# Patient Record
Sex: FEMALE | Race: WHITE
[De-identification: names, ages, dates, MRNs, and addresses within clinical notes are randomized per-mention and may not be internally consistent; named-entity substitution may affect disease eponyms.]

---

## 2019-02-23 ENCOUNTER — HOSPITAL ENCOUNTER (EMERGENCY)
Dept: HOSPITAL 97 - ER | Age: 59
Discharge: HOME | End: 2019-02-23
Payer: SELF-PAY

## 2019-02-23 VITALS — SYSTOLIC BLOOD PRESSURE: 138 MMHG | TEMPERATURE: 98.8 F | OXYGEN SATURATION: 99 % | DIASTOLIC BLOOD PRESSURE: 94 MMHG

## 2019-02-23 DIAGNOSIS — I89.1: ICD-10-CM

## 2019-02-23 DIAGNOSIS — L03.818: ICD-10-CM

## 2019-02-23 DIAGNOSIS — F20.9: ICD-10-CM

## 2019-02-23 DIAGNOSIS — Z88.5: ICD-10-CM

## 2019-02-23 DIAGNOSIS — Z88.1: ICD-10-CM

## 2019-02-23 DIAGNOSIS — H53.9: ICD-10-CM

## 2019-02-23 DIAGNOSIS — H57.11: Primary | ICD-10-CM

## 2019-02-23 LAB
ALBUMIN SERPL BCP-MCNC: 3.5 G/DL (ref 3.4–5)
ALP SERPL-CCNC: 220 U/L (ref 45–117)
ALT SERPL W P-5'-P-CCNC: 32 U/L (ref 12–78)
AST SERPL W P-5'-P-CCNC: 16 U/L (ref 15–37)
BUN BLD-MCNC: 4 MG/DL (ref 7–18)
GLUCOSE SERPLBLD-MCNC: 107 MG/DL (ref 74–106)
HCT VFR BLD CALC: 38.9 % (ref 36–45)
LYMPHOCYTES # SPEC AUTO: 1.3 K/UL (ref 0.7–4.9)
PMV BLD: 8.9 FL (ref 7.6–11.3)
POTASSIUM SERPL-SCNC: 3.5 MMOL/L (ref 3.5–5.1)
RBC # BLD: 4.36 M/UL (ref 3.86–4.86)
VALPROATE SERPL-MCNC: 44 UG/ML (ref 50–100)

## 2019-02-23 PROCEDURE — 80053 COMPREHEN METABOLIC PANEL: CPT

## 2019-02-23 PROCEDURE — 36415 COLL VENOUS BLD VENIPUNCTURE: CPT

## 2019-02-23 PROCEDURE — 99284 EMERGENCY DEPT VISIT MOD MDM: CPT

## 2019-02-23 PROCEDURE — 96360 HYDRATION IV INFUSION INIT: CPT

## 2019-02-23 PROCEDURE — 80164 ASSAY DIPROPYLACETIC ACD TOT: CPT

## 2019-02-23 PROCEDURE — 85025 COMPLETE CBC W/AUTO DIFF WBC: CPT

## 2019-02-23 NOTE — ER
Nurse's Notes                                                                                     

 [facility]                                                                

Name: Marcia Peña                                                                              

Age: 58 yrs                                                                                       

Sex: Female                                                                                       

: 1960                                                                                   

MRN: [id number]                                                                                   

Arrival Date: 2019                                                                          

Time: 08:03                                                                                       

Account#: [id number]                                                                            

Bed 5                                                                                             

Private MD:                                                                                       

Diagnosis: Visual disturbances;Visual discomfort, right eye;Schizophrenia;Cellulitis and acute    

  lymphangitis of other parts of limb                                                             

                                                                                                  

Presentation:                                                                                     

                                                                                             

08:12 Presenting complaint: Patient states: I feel like something is in my R eye, for the     ch  

      past 8 weeks. It feels like something is in there, day and night. AS well, for the past     

      many years, actually all my life, I have had foot problems. At least 50 years. they         

      itch and burn all the time. Transition of care: patient was not received from another       

      setting of care. Onset of symptoms was 2018. Risk Assessment: Do you want to       

      hurt yourself or someone else? Patient reports no desire to harm self or others.            

      Initial Sepsis Screen: Does the patient meet any 2 criteria? No. Patient's initial          

      sepsis screen is negative. Does the patient have a suspected source of infection? No.       

      Patient's initial sepsis screen is negative. Care prior to arrival: None.                   

08:12 Method Of Arrival: Ambulatory                                                             

08:12 Acuity: JIMMY 4                                                                           ch  

                                                                                                  

Triage Assessment:                                                                                

08:15 General: Appears in no apparent distress. comfortable, Behavior is cooperative,         ch  

      anxious, restless. Pain: Complains of pain in right eye, right foot and left foot Pain      

      currently is 5 out of 10 on a pain scale. Pain began years ago. EENT: Eyes pt eyes both     

      appear WDP. Neuro: No deficits noted. Level of Consciousness is awake, alert, obeys         

      commands, Oriented to person, place, time, situation. Cardiovascular: Denies chest          

      pain. Respiratory: Airway is patent Respiratory effort is even, unlabored, Breath           

      sounds are clear bilaterally. GI: No signs and/or symptoms were reported involving the      

      gastrointestinal system. Derm: Skin is pink, warm \T\ dry.                                  

                                                                                                  

Historical:                                                                                       

- Allergies:                                                                                      

08:15 Codeine;                                                                                ch  

08:15 Erythromycin;                                                                           ch  

- Home Meds:                                                                                      

08:15 pt states she takes medictions but does not know what they are [Active];                ch  

- PMHx:                                                                                           

08:15 Schizophrenia; pt denies any medical hx;                                                ch  

- PSHx:                                                                                           

08:15 colon surgery; Cholecystectomy; left foot surgery; bladder;                             ch  

                                                                                                  

- Immunization history:: Adult Immunizations not up to date, Last tetanus immunization:           

  not indicated for visit today. Flu vaccine is not up to date.                                   

- Social history:: Smoking status: Patient uses tobacco products, smokes one-half pack            

  cigarettes per day, Patient/guardian denies using alcohol, street drugs.                        

- Ebola Screening: : Patient negative for fever greater than or equal to 101.5 degrees            

  Fahrenheit, and additional compatible Ebola Virus Disease symptoms Patient denies               

  exposure to infectious person Patient denies travel to an Ebola-affected area in the            

  21 days before illness onset No symptoms or risks identified at this time.                      

                                                                                                  

                                                                                                  

Screenin:18 Abuse screen: Denies threats or abuse. Denies injuries from another. Nutritional        ch  

      screening: No deficits noted. Tuberculosis screening: No symptoms or risk factors           

      identified. Fall Risk None identified.                                                      

                                                                                                  

Assessment:                                                                                       

08:18 Reassessment: Patient appears in no apparent distress at this time. No changes from       

      previously documented assessment. Patient and/or family updated on plan of care and         

      expected duration. Pain level reassessed.                                                   

08:57 Reassessment: AWAITING ERP TO ASSESS AND TREAT PT. General: Appears in no apparent        

      distress. comfortable, Behavior is appropriate for age, restless.                           

09:11 Reassessment: Patient appears in no apparent distress at this time. Patient and/or        

      family updated on plan of care and expected duration. Pain level reassessed. PHYSICIAN      

      HAS SEEN PT, IV WILL BE INITIATED.                                                          

10:12 Reassessment: Patient appears in no apparent distress at this time. No changes from       

      previously documented assessment. Patient and/or family updated on plan of care and         

      expected duration. Pain level reassessed. Patient is alert, oriented x 3, equal             

      unlabored respirations, skin warm/dry/pink. PT ATTEMPTS TO URINATE, UNSUCCESSFULLY. ERP     

      STATES PT CAN BE DISCHARGED AS SHE HAS NO URINARY SYMPTOMS. Patient denies pain at this     

      time. Patient states feeling better. Patient states symptoms have improved.                 

                                                                                                  

Vital Signs:                                                                                      

08:15  / 100; Pulse 96; Resp 20; Temp 97.5; Pulse Ox 98% on R/A; Weight 90.72 kg;         

      Height 5 ft. 4 in. (162.56 cm); Pain 5/10;                                                  

08:57  / 94; Pulse 96; Resp 14; Temp 98.8; Pulse Ox 99% on R/A; Pain 5/10;              ch  

10:32  / 97; Pulse 98; Resp 16; Temp 98.2; Pulse Ox 99% on R/A; Pain 5/10;              ch  

08:15 Body Mass Index 34.33 (90.72 kg, 162.56 cm)                                             ch  

                                                                                                  

Visual Acuity:                                                                                    

08:58 Left Eye Visual acuity 20/30, Pupil size 4 mm, Normal, React To Light, Reactive To      ch  

      Accomodation; Right Eye Visual acuity 20/40, Pupil size 4 mm, Normal, React To Light,       

      Reactive To Accomodation; Both Eyes Visual acuity 20/30; With Lenses;                       

                                                                                                  

ED Course:                                                                                        

08:03 Patient arrived in ED.                                                                  as  

08:08 Syed Saravia MD is Attending Physician.                                             luh 

08:12 Veronica Mustafa, RN is Primary Nurse.                                                ch  

08:14 Triage completed.                                                                       ch  

08:15 Arm band placed on left wrist. Patient placed in an exam room, on a stretcher, on pulse   

      oximetry.                                                                                   

08:18 No apparent distress. Resting quietly.                                                  ch  

08:18 Patient has correct armband on for positive identification. Bed in low position. Call     

      light in reach. Side rails up X 1. Pulse ox on. NIBP on. PO fluids given. Verbal            

      reassurance given.                                                                          

09:24 Initial lab(s) drawn, by me, sent to lab. Inserted saline lock: 20 gauge in left        em1 

      antecubital area, using aseptic technique. Blood collected. Missed attempt(s): 22 gauge     

      in left forearm. Bleeding controlled, band aid applied, catheter tip intact.                

10:09 Fabio Vásquez MD is Referral Physician.                                              luh 

10:12 No provider procedures requiring assistance completed. IV discontinued, intact,         ch  

      bleeding controlled, No redness/swelling at site. Pressure dressing applied.                

                                                                                                  

Administered Medications:                                                                         

09:00 Drug: Tetracaine Drops 0.5 % 1 drops Route: Ophthalmic; Site: both eyes;                  

09:11 Follow up: Response: No adverse reaction                                                  

09:25 Drug: NS 0.9% 500 ml Route: IV; Rate: bolus; Site: left antecubital;                    ch  

10:00 Follow up: IV Status: Completed infusion; IV Intake: 500ml                              ch  

10:18 Drug: Depakene 500 mg Route: PO;                                                          

10:22 Follow up: Response: No adverse reaction                                                ch  

                                                                                                  

                                                                                                  

Intake:                                                                                           

10:00 IV: 500ml; Total: 500ml.                                                                  

                                                                                                  

Outcome:                                                                                          

10:09 Discharge ordered by MD.                                                                luh 

10:12 Discharged to home ambulatory.                                                            

10:12 Condition: stable                                                                           

10:12 Discharge instructions given to patient, Instructed on discharge instructions, follow       

      up and referral plans. medication usage, Demonstrated understanding of instructions,        

      follow-up care, medications, Prescriptions given X 2.                                       

10:24 Patient left the ED.                                                                      

                                                                                                  

Signatures:                                                                                       

Veronica Mustafa RN                  RN   Syed Puente MD MD cha Martinez, Amelia as Martinez, Eric                               1                                                  

                                                                                                  

Corrections: (The following items were deleted from the chart)                                    

10:13 08:57 Patient did not have IV access during this emergency room visit. [facility]  

                                                                                                  

**************************************************************************************************

## 2019-02-23 NOTE — EDPHYS
Physician Documentation                                                                           

 [facility]                                                                

Name: Marcia Peña                                                                              

Age: 58 yrs                                                                                       

Sex: Female                                                                                       

: 1960                                                                                   

MRN: [id number]                                                                                   

Arrival Date: 2019                                                                          

Time: 08:03                                                                                       

Account#: [id number]                                                                            

Bed 5                                                                                             

Private MD:                                                                                       

Syed Forte                                                                      

HPI:                                                                                              

                                                                                             

09:08 This 58 yrs old  Female presents to ER via Ambulatory with complaints of Eye   luh 

      Problem.                                                                                    

09:08 The patient is experiencing pain. Onset: The symptoms/episode began/occurred 1 month(s) luh 

      ago. Aggravated by nothing. Alleviated by nothing. Associated signs and symptoms:           

      Pertinent positives: None.                                                                  

                                                                                                  

Historical:                                                                                       

- Allergies:                                                                                      

08:15 Codeine;                                                                                ch  

08:15 Erythromycin;                                                                           ch  

- Home Meds:                                                                                      

08:15 pt states she takes medictions but does not know what they are [Active];                ch  

- PMHx:                                                                                           

08:15 Schizophrenia; pt denies any medical hx;                                                ch  

- PSHx:                                                                                           

08:15 colon surgery; Cholecystectomy; left foot surgery; bladder;                             ch  

                                                                                                  

- Immunization history:: Adult Immunizations not up to date, Last tetanus immunization:           

  not indicated for visit today. Flu vaccine is not up to date.                                   

- Social history:: Smoking status: Patient uses tobacco products, smokes one-half pack            

  cigarettes per day, Patient/guardian denies using alcohol, street drugs.                        

- Ebola Screening: : Patient negative for fever greater than or equal to 101.5 degrees            

  Fahrenheit, and additional compatible Ebola Virus Disease symptoms Patient denies               

  exposure to infectious person Patient denies travel to an Ebola-affected area in the            

  21 days before illness onset No symptoms or risks identified at this time.                      

                                                                                                  

                                                                                                  

ROS:                                                                                              

09:08 Constitutional: Negative for fever, chills, and weight loss, ENT: Negative for injury,  luh 

      pain, and discharge, Neck: Negative for injury, pain, and swelling, Cardiovascular:         

      Negative for chest pain, palpitations, and edema, Respiratory: Negative for shortness       

      of breath, cough, wheezing, and pleuritic chest pain, Abdomen/GI: Negative for              

      abdominal pain, nausea, vomiting, diarrhea, and constipation, Back: Negative for injury     

      and pain, : Negative for injury, bleeding, discharge, and swelling, Skin: Negative        

      for injury, rash, and discoloration, Neuro: Negative for headache, weakness, numbness,      

      tingling, and seizure, Psych: Negative for depression, anxiety, suicide ideation,           

      homicidal ideation, and hallucinations, Allergy/Immunology: Negative for hives, rash,       

      and allergies, Endocrine: Negative for neck swelling, polydipsia, polyuria, polyphagia,     

      and marked weight changes, Hematologic/Lymphatic: Negative for swollen nodes, abnormal      

      bleeding, and unusual bruising.                                                             

09:08 Eyes: Positive for blurry vision, pain, of the iris of right eye.                           

09:08 MS/extremity: Positive for erythema, pain, swelling, tenderness.                            

                                                                                                  

Exam:                                                                                             

09:08 Constitutional:  This is a well developed, well nourished patient who is awake, alert,  luh 

      and in no acute distress. Head/Face:  Normocephalic, atraumatic. ENT:  Nares patent. No     

      nasal discharge, no septal abnormalities noted.  Tympanic membranes are normal and          

      external auditory canals are clear.  Oropharynx with no redness, swelling, or masses,       

      exudates, or evidence of obstruction, uvula midline.  Mucous membranes moist. Neck:         

      Trachea midline, no thyromegaly or masses palpated, and no cervical lymphadenopathy.        

      Supple, full range of motion without nuchal rigidity, or vertebral point tenderness.        

      No Meningismus. Chest/axilla:  Normal chest wall appearance and motion.  Nontender with     

      no deformity.  No lesions are appreciated. Cardiovascular:  Regular rate and rhythm         

      with a normal S1 and S2.  No gallops, murmurs, or rubs.  Normal PMI, no JVD.  No pulse      

      deficits. Respiratory:  Lungs have equal breath sounds bilaterally, clear to                

      auscultation and percussion.  No rales, rhonchi or wheezes noted.  No increased work of     

      breathing, no retractions or nasal flaring. Abdomen/GI:  Soft, non-tender, with normal      

      bowel sounds.  No distension or tympany.  No guarding or rebound.  No evidence of           

      tenderness throughout. Back:  No spinal tenderness.  No costovertebral tenderness.          

      Full range of motion. Skin:  Warm, dry with normal turgor.  Normal color with no            

      rashes, no lesions, and no evidence of cellulitis. Neuro:  Awake and alert, GCS 15,         

      oriented to person, place, time, and situation.  Cranial nerves II-XII grossly intact.      

      Motor strength 5/5 in all extremities.  Sensory grossly intact.  Cerebellar exam            

      normal.  Normal gait. Psych:  Awake, alert, with orientation to person, place and time.     

       Behavior, mood, and affect are within normal limits.                                       

09:08 Eyes: Pupils: no acute changes, equal, round, and reactive to light and accomodation,       

      Extraocular movements: no acute changes, Conjunctiva: normal, no acute changes,             

      Corneas: are normal, no acute changes, Sclera: no appreciated abnormality, Anterior         

      chamber: normal, no acute changes, Lids and lashes: appear normal, no acute changes,        

      funduscopic exam reveals no obvious abnormalities, no acute changes, discs that are         

      sharp, no appreciated papilledema, no retinal detachment, no enlargement of the optic       

      cup, no appreciated A-V knicking, no evidence of cotton wool exudatates, no flame           

      hemorrhages, no macular cherry red spot, no afferent papillary defect, Visual fields:       

      are intact, no acute changes, Nystagmus: is not appreciated.                                

09:08 Musculoskeletal/extremity: DVT Exam: No signs of deep vein thrombosis. no pain, no      luh 

      swelling, no tenderness, negative Homans' sign noted on exam, no appreciated bluish         

      discoloration, no erythema, no increased warmth.                                            

                                                                                                  

Vital Signs:                                                                                      

08:15  / 100; Pulse 96; Resp 20; Temp 97.5; Pulse Ox 98% on R/A; Weight 90.72 kg;       ch  

      Height 5 ft. 4 in. (162.56 cm); Pain 5/10;                                                  

08:57  / 94; Pulse 96; Resp 14; Temp 98.8; Pulse Ox 99% on R/A; Pain 5/10;              ch  

10:32  / 97; Pulse 98; Resp 16; Temp 98.2; Pulse Ox 99% on R/A; Pain 5/10;              ch  

08:15 Body Mass Index 34.33 (90.72 kg, 162.56 cm)                                               

                                                                                                  

Visual Acuity:                                                                                    

08:58 Left Eye Visual acuity 20/30, Pupil size 4 mm, Normal, React To Light, Reactive To      ch  

      Accomodation; Right Eye Visual acuity 20/40, Pupil size 4 mm, Normal, React To Light,       

      Reactive To Accomodation; Both Eyes Visual acuity 20/30; With Lenses;                       

                                                                                                  

MDM:                                                                                              

08:08 Patient medically screened.                                                             [facility] 

09:10 Data reviewed: vital signs, nurses notes, lab test result(s).                           [facility] 

                                                                                                  

                                                                                             

09:07 Order name: CBC with Diff; Complete Time: 09:42                                         [facility] 

                                                                                             

09:07 Order name: Comprehensive Metabolic Panel; Complete Time: 10:08                         [facility] 

                                                                                             

09:07 Order name: Depakote; Complete Time: 10:08                                              [facility] 

                                                                                             

08:42 Order name: Eye Tray; Complete Time: 08:56                                                

                                                                                             

08:42 Order name: Fluoresene Opth strip; Complete Time: 08:57                                   

                                                                                             

08:57 Order name: Visual Acuity; Complete Time: 08:57                                           

                                                                                                  

Administered Medications:                                                                         

09:00 Drug: Tetracaine Drops 0.5 % 1 drops Route: Ophthalmic; Site: both eyes;                  

09:11 Follow up: Response: No adverse reaction                                                  

09:25 Drug: NS 0.9% 500 ml Route: IV; Rate: bolus; Site: left antecubital;                      

10:00 Follow up: IV Status: Completed infusion; IV Intake: 500ml                                

10:18 Drug: Depakene 500 mg Route: PO;                                                          

10:22 Follow up: Response: No adverse reaction                                                  

                                                                                                  

                                                                                                  

Disposition:                                                                                      

19 10:09 Discharged to Home. Impression: Visual disturbances, Visual discomfort, right      

  eye, Schizophrenia, Cellulitis and acute lymphangitis of other parts of limb.                   

- Condition is Stable.                                                                            

- Discharge Instructions: Blurred Vision, Adult, Cellulitis, Adult, Easy-to-Read.                 

- Prescriptions for Amitriptyline 25 mg Oral Tablet - take 1 tablet by ORAL route At              

  bedtime As needed; 20 tablet. Bactrim - 160 mg Oral Tablet - take 1 tablet by             

  ORAL route every 12 hours for 10 days; 20 tablet.                                               

- Medication Reconciliation Form, Thank You Letter, Antibiotic Education, Prescription            

  Opioid Use form.                                                                                

- Follow up: Private Physician; When: 2 - 3 days; Reason: Recheck today's complaints,             

  Continuance of care, Re-evaluation by your physician. Follow up: Fabio Vásquez;                

  When: 2 - 3 days; Reason: Recheck today's complaints, Continuance of care,                      

  Re-evaluation by your physician.                                                                

- Problem is new.                                                                                 

- Symptoms have improved.                                                                         

                                                                                                  

                                                                                                  

                                                                                                  

Signatures:                                                                                       

Dispatcher [employer]                           Veronica Louie RN                  Syed Roblero ch, MD MD cha                                                  

                                                                                                  

Corrections: (The following items were deleted from the chart)                                    

10:24 10:09 2019 10:09 Discharged to Home. Impression: Visual disturbances; Visual      ch  

      discomfort, right eye; Schizophrenia; Cellulitis and acute lymphangitis of other parts      

      of limb. Condition is Stable. Discharge Instructions: Blurred Vision, Adult,                

      Cellulitis, Adult, Easy-to-Read. Prescriptions for Amitriptyline 25 mg Oral Tablet -        

      take 1 tablet by ORAL route At bedtime As needed; 20 tablet, Bactrim -160 mg Oral     

      Tablet - take 1 tablet by ORAL route every 12 hours for 10 days; 20 tablet. and Forms       

      are Medication Reconciliation Form, Thank You Letter, Antibiotic Education,                 

      Prescription Opioid Use. Follow up: Private Physician; When: 2 - 3 days; Reason:            

      Recheck today's complaints, Continuance of care, Re-evaluation by your physician.           

      Follow up: Fabio Vásquez; When: 2 - 3 days; Reason: Recheck today's complaints,            

      Continuance of care, Re-evaluation by your physician. Problem is new. Symptoms have         

      improved. luh                                                                               

                                                                                                  

**************************************************************************************************

## 2019-03-20 ENCOUNTER — HOSPITAL ENCOUNTER (EMERGENCY)
Dept: HOSPITAL 97 - ER | Age: 59
Discharge: PSYCHIATRIC OR SUBSTANCE USE FACILITY | End: 2019-03-20
Payer: SELF-PAY

## 2019-03-20 VITALS — DIASTOLIC BLOOD PRESSURE: 95 MMHG | OXYGEN SATURATION: 99 % | SYSTOLIC BLOOD PRESSURE: 137 MMHG

## 2019-03-20 VITALS — TEMPERATURE: 97.6 F

## 2019-03-20 DIAGNOSIS — Z88.5: ICD-10-CM

## 2019-03-20 DIAGNOSIS — Z88.3: ICD-10-CM

## 2019-03-20 DIAGNOSIS — F20.9: Primary | ICD-10-CM

## 2019-03-20 LAB
ALBUMIN SERPL BCP-MCNC: 3.6 G/DL (ref 3.4–5)
ALP SERPL-CCNC: 184 U/L (ref 45–117)
ALT SERPL W P-5'-P-CCNC: 46 U/L (ref 12–78)
AST SERPL W P-5'-P-CCNC: 27 U/L (ref 15–37)
BUN BLD-MCNC: 4 MG/DL (ref 7–18)
GLUCOSE SERPLBLD-MCNC: 100 MG/DL (ref 74–106)
HCT VFR BLD CALC: 39.5 % (ref 36–45)
INR BLD: 1.17
LYMPHOCYTES # SPEC AUTO: 1.8 K/UL (ref 0.7–4.9)
METHAMPHET UR QL SCN: NEGATIVE
PMV BLD: 10 FL (ref 7.6–11.3)
POTASSIUM SERPL-SCNC: 3.6 MMOL/L (ref 3.5–5.1)
RBC # BLD: 4.44 M/UL (ref 3.86–4.86)
THC SERPL-MCNC: NEGATIVE NG/ML

## 2019-03-20 PROCEDURE — 85730 THROMBOPLASTIN TIME PARTIAL: CPT

## 2019-03-20 PROCEDURE — 99285 EMERGENCY DEPT VISIT HI MDM: CPT

## 2019-03-20 PROCEDURE — 80307 DRUG TEST PRSMV CHEM ANLYZR: CPT

## 2019-03-20 PROCEDURE — 81003 URINALYSIS AUTO W/O SCOPE: CPT

## 2019-03-20 PROCEDURE — 93005 ELECTROCARDIOGRAM TRACING: CPT

## 2019-03-20 PROCEDURE — 80164 ASSAY DIPROPYLACETIC ACD TOT: CPT

## 2019-03-20 PROCEDURE — 80320 DRUG SCREEN QUANTALCOHOLS: CPT

## 2019-03-20 PROCEDURE — 80076 HEPATIC FUNCTION PANEL: CPT

## 2019-03-20 PROCEDURE — 85610 PROTHROMBIN TIME: CPT

## 2019-03-20 PROCEDURE — 36415 COLL VENOUS BLD VENIPUNCTURE: CPT

## 2019-03-20 PROCEDURE — 80048 BASIC METABOLIC PNL TOTAL CA: CPT

## 2019-03-20 PROCEDURE — 96372 THER/PROPH/DIAG INJ SC/IM: CPT

## 2019-03-20 PROCEDURE — 51702 INSERT TEMP BLADDER CATH: CPT

## 2019-03-20 PROCEDURE — 85025 COMPLETE CBC W/AUTO DIFF WBC: CPT

## 2019-03-20 PROCEDURE — 80329 ANALGESICS NON-OPIOID 1 OR 2: CPT

## 2019-03-20 NOTE — EDPHYS
Physician Documentation                                                                           

 [facility]                                                                

Name: Marcia Peña                                                                              

Age: 58 yrs                                                                                       

Sex: Female                                                                                       

: 1960                                                                                   

MRN: [id number]                                                                                   

Arrival Date: 2019                                                                          

Time: 15:06                                                                                       

Account#: [id number]                                                                            

Bed 16                                                                                            

Private MD:                                                                                       

ED Physician Syed Saravia                                                                      

HPI:                                                                                              

                                                                                             

16:05 This 58 yrs old  Female presents to ER via EMS with complaints of Psych        luh 

      Problem.                                                                                    

16:05 The patient presents to the emergency department with anxiety, depression, psychosis,   luh 

      has delusions. Onset: The symptoms/episode began/occurred 3 day(s) ago. Past                

      psychiatric history: Prior diagnosis: schizophrenia, Psychiatric medications include:       

      unk. Associated signs and symptoms: The patient has no apparent associated signs or         

      symptoms. Severity of symptoms: At their worst the symptoms were mild moderate in the       

      emergency department the symptoms are unchanged. The patient has not experienced            

      similar symptoms in the past.                                                               

                                                                                                  

Historical:                                                                                       

- Allergies:                                                                                      

15:09 Codeine;                                                                                bp  

15:09 Erythromycin;                                                                           bp  

- Home Meds:                                                                                      

17:29 Depakote  mg Oral Tb24 twice a day [Active]; paroxetine HCl 20 mg oral tab 1 tab  iw  

      once daily [Active]; risperidone 3 mg oral tab 1 tab 2 times per day [Active];              

      benztropine 0.5 mg Oral tab 1 tab 2 times per day [Active]; amitriptyline 100 mg Oral       

      tab 1 tab 2 times per day [Active]; loxapine succinate 25 mg Oral cap 3 times per day       

      [Active];                                                                                   

- PMHx:                                                                                           

15:09 Schizophrenia;                                                                          bp  

- PSHx:                                                                                           

15:09 Unable to obtain;                                                                       bp  

                                                                                                  

- Immunization history:: Adult Immunizations up to date.                                          

- Social history:: Smoking status: unknown.                                                       

- Ebola Screening: : Patient negative for fever greater than or equal to 101.5 degrees            

  Fahrenheit, and additional compatible Ebola Virus Disease symptoms Patient denies               

  exposure to infectious person Patient denies travel to an Ebola-affected area in the            

  21 days before illness onset No symptoms or risks identified at this time.                      

- Family history:: not pertinent.                                                                 

                                                                                                  

                                                                                                  

ROS:                                                                                              

16:05 Constitutional: Negative for fever, chills, and weight loss, Eyes: Negative for injury, luh 

      pain, redness, and discharge, ENT: Negative for injury, pain, and discharge, Neck:          

      Negative for injury, pain, and swelling, Cardiovascular: Negative for chest pain,           

      palpitations, and edema, Respiratory: Negative for shortness of breath, cough,              

      wheezing, and pleuritic chest pain, Abdomen/GI: Negative for abdominal pain, nausea,        

      vomiting, diarrhea, and constipation, Back: Negative for injury and pain, : Negative      

      for injury, bleeding, discharge, and swelling, MS/Extremity: Negative for injury and        

      deformity, Skin: Negative for injury, rash, and discoloration, Psych: Negative for          

      depression, anxiety, suicide ideation, homicidal ideation, and hallucinations,              

      Allergy/Immunology: Negative for hives, rash, and allergies, Endocrine: Negative for        

      neck swelling, polydipsia, polyuria, polyphagia, and marked weight changes,                 

      Hematologic/Lymphatic: Negative for swollen nodes, abnormal bleeding, and unusual           

      bruising.                                                                                   

16:05 Neuro: Positive for altered mental status, weakness.                                        

16:05 Psych: Positive for anxiety, psychosis.                                                     

                                                                                                  

Exam:                                                                                             

16:05 Constitutional:  This is a well developed, well nourished patient who is awake, alert,  luh 

      and in no acute distress. Head/Face:  Normocephalic, atraumatic. Eyes:  Pupils equal        

      round and reactive to light, extra-ocular motions intact.  Lids and lashes normal.          

      Conjunctiva and sclera are non-icteric and not injected.  Cornea within normal limits.      

      Periorbital areas with no swelling, redness, or edema. ENT:  Nares patent. No nasal         

      discharge, no septal abnormalities noted.  Tympanic membranes are normal and external       

      auditory canals are clear.  Oropharynx with no redness, swelling, or masses, exudates,      

      or evidence of obstruction, uvula midline.  Mucous membranes moist. Neck:  Trachea          

      midline, no thyromegaly or masses palpated, and no cervical lymphadenopathy.  Supple,       

      full range of motion without nuchal rigidity, or vertebral point tenderness.  No            

      Meningismus. Chest/axilla:  Normal chest wall appearance and motion.  Nontender with no     

      deformity.  No lesions are appreciated. Cardiovascular:  Regular rate and rhythm with a     

      normal S1 and S2.  No gallops, murmurs, or rubs.  Normal PMI, no JVD.  No pulse             

      deficits. Respiratory:  Lungs have equal breath sounds bilaterally, clear to                

      auscultation and percussion.  No rales, rhonchi or wheezes noted.  No increased work of     

      breathing, no retractions or nasal flaring. Abdomen/GI:  Soft, non-tender, with normal      

      bowel sounds.  No distension or tympany.  No guarding or rebound.  No evidence of           

      tenderness throughout. Back:  No spinal tenderness.  No costovertebral tenderness.          

      Full range of motion. Skin:  Warm, dry with normal turgor.  Normal color with no            

      rashes, no lesions, and no evidence of cellulitis. MS/ Extremity:  Pulses equal, no         

      cyanosis.  Neurovascular intact.  Full, normal range of motion. Neuro:  Awake and           

      alert, GCS 15, oriented to person, place, time, and situation.  Cranial nerves II-XII       

      grossly intact.  Motor strength 5/5 in all extremities.  Sensory grossly intact.            

      Cerebellar exam normal.  Normal gait. Psych:  Awake, alert, with orientation to person,     

      place and time.  Behavior, mood, and affect are within normal limits.                       

                                                                                                  

Vital Signs:                                                                                      

15:06  / 87; Pulse 85; Resp 22; Temp 97.8; Pulse Ox 96% ; Weight 113.4 kg;              bp  

17:02  / 80; Pulse 71; Resp 18; Temp 97.8(O); Pulse Ox 98% on R/A;                      mh5 

18:04  / 70; Pulse 72; Resp 18; Temp 97.6(O); Pulse Ox 97% on R/A;                      mh5 

19:01  / 87; Pulse 65; Resp 20; Pulse Ox 100% ;                                         mh5 

19:34  / 95; Pulse 66; Resp 16; Pulse Ox 99% on R/A;                                    mt  

                                                                                                  

MDM:                                                                                              

15:08 Patient medically screened.                                                             [facility] 

16:05 Data reviewed: vital signs, nurses notes, lab test result(s), EKG.                                                                                                                   

15:10 Order name: Acetaminophen; Complete Time: 17:40                                                                                                                                      

15:10 Order name: Basic Metabolic Panel; Complete Time: 17:40                                                                                                                              

15:10 Order name: CBC with Diff; Complete Time: 16:17                                                                                                                                      

15:10 Order name: ETOH Level; Complete Time: 17:40                                                                                                                                         

15:10 Order name: Hepatic Function; Complete Time: 17:40                                                                                                                                   

15:10 Order name: PT-INR; Complete Time: 16:17                                                                                                                                             

15:10 Order name: Ptt, Activated; Complete Time: 16:17                                                                                                                                     

15:10 Order name: Salicylate; Complete Time: 17:40                                                                                                                                         

15:10 Order name: Urine Drug Screen; Complete Time: 17:40                                     [facility] 

                                                                                             

16:15 Order name: Depakote; Complete Time: 17:40                                              [facility] 

                                                                                             

16:58 Order name: Urine Dipstick--Ancillary (enter results); Complete Time: 17:40               

                                                                                             

15:10 Order name: EKG; Complete Time: 15:11                                                   [facility] 

                                                                                             

15:10 Order name: EKG - Nurse/Tech; Complete Time: 15:54                                      [facility] 

                                                                                             

15:10 Order name: IV Saline Lock; Complete Time: 15:53                                        [facility] 

                                                                                             

15:10 Order name: Labs collected and sent; Complete Time: 15:54                               [facility] 

                                                                                             

15:10 Order name: Urine Dipstick-Ancillary (obtain specimen); Complete Time: 17:04            [facility] 

                                                                                             

16:03 Order name: Diet Regular; Complete Time: 16:04                                          mh5 

                                                                                                  

Administered Medications:                                                                         

15:54 Not Given (Patient Refused): NS 0.9% 500 ml IV at bolus once                            bp  

16:16 Drug: Geodon 20 mg Route: IM; Site: left deltoid;                                       bp  

18:34 Follow up: Response: Anxiety decreased                                                  bp  

16:17 Drug: Ativan 2 mg Route: IM; Site: right deltoid;                                       bp  

18:34 Follow up: Response: No adverse reaction; Anxiety decreased                             bp  

                                                                                                  

                                                                                                  

Disposition:                                                                                      

19 16:09 Transfer ordered to Psych Facility. Diagnosis is Schizophrenia - psychosis.        

- Reason for transfer: Higher level of care.                                                      

- Accepting physician is to psych.                                                                

- Condition is Stable.                                                                            

- Problem is new.                                                                                 

- Symptoms have improved.                                                                         

                                                                                                  

                                                                                                  

                                                                                                  

Signatures:                                                                                       

Dispatcher [employer]                           Syed Jacobs MD MD cha Williams, Irene, RN                     DARSHAN                                                      

Fritz Bansal RN RN bp Roque, Raymond, RN                      RN   rr5                                                  

                                                                                                  

Corrections: (The following items were deleted from the chart)                                    

17:29 15:09 Home Meds: pt states she takes medictions but does not know what they are; [facility]  

19:54 16:09 2019 16:09 Transfer ordered to Psych Facility. Diagnosis is Schizophrenia - rr5 

      psychosis. Reason for transfer: Higher level of care. Accepting physician is to psych.      

      Condition is Stable. Problem is new. Symptoms have improved. luh                            

                                                                                                  

**************************************************************************************************

## 2019-03-20 NOTE — ER
Nurse's Notes                                                                                     

 [facility]                                                                

Name: Marcia Peña                                                                              

Age: 58 yrs                                                                                       

Sex: Female                                                                                       

: 1960                                                                                   

MRN: [id number]                                                                                   

Arrival Date: 2019                                                                          

Time: 15:06                                                                                       

Account#: [id number]                                                                            

Bed 16                                                                                            

Private MD:                                                                                       

Diagnosis: Schizophrenia-psychosis                                                                

                                                                                                  

Presentation:                                                                                     

                                                                                             

15:06 Presenting complaint: EMS states: UNKNOWN PSYCH DISTURBANCE. Transition of care:        bp  

      patient was not received from another setting of care. Onset of symptoms is unknown.        

      Risk Assessment: Do you want to hurt yourself or someone else? Unable to obtain.            

      Initial Sepsis Screen: Does the patient meet any 2 criteria? No. Patient's initial          

      sepsis screen is negative. Does the patient have a suspected source of infection? No.       

      Patient's initial sepsis screen is negative. Care prior to arrival: Glucose check: 148.     

15:06 Method Of Arrival: EMS: [facility]                                                bp  

15:06 Acuity: JIMMY 2                                                                           bp  

                                                                                                  

Triage Assessment:                                                                                

15:09 General: Appears in no apparent distress. obese, unkempt, Behavior is agitated,         bp  

      anxious. Pain: Denies pain. EENT: No deficits noted. Neuro: Level of Consciousness is       

      awake, confused, Oriented to none. Cardiovascular: Rhythm is sinus rhythm. Respiratory:     

      Airway is patent Respiratory effort is even, unlabored, Respiratory pattern is regular,     

      symmetrical. GI: No signs and/or symptoms were reported involving the gastrointestinal      

      system. : No signs and/or symptoms were reported regarding the genitourinary system.      

      Derm: No deficits noted. Musculoskeletal: Circulation, motion, and sensation intact.        

      Range of motion: intact in all extremities.                                                 

                                                                                                  

Historical:                                                                                       

- Allergies:                                                                                      

15:09 Codeine;                                                                                bp  

15:09 Erythromycin;                                                                           bp  

- Home Meds:                                                                                      

17:29 Depakote  mg Oral Tb24 twice a day [Active]; paroxetine HCl 20 mg oral tab 1 tab  iw  

      once daily [Active]; risperidone 3 mg oral tab 1 tab 2 times per day [Active];              

      benztropine 0.5 mg Oral tab 1 tab 2 times per day [Active]; amitriptyline 100 mg Oral       

      tab 1 tab 2 times per day [Active]; loxapine succinate 25 mg Oral cap 3 times per day       

      [Active];                                                                                   

- PMHx:                                                                                           

15:09 Schizophrenia;                                                                          bp  

- PSHx:                                                                                           

15:09 Unable to obtain;                                                                       bp  

                                                                                                  

- Immunization history:: Adult Immunizations up to date.                                          

- Social history:: Smoking status: unknown.                                                       

- Ebola Screening: : Patient negative for fever greater than or equal to 101.5 degrees            

  Fahrenheit, and additional compatible Ebola Virus Disease symptoms Patient denies               

  exposure to infectious person Patient denies travel to an Ebola-affected area in the            

  21 days before illness onset No symptoms or risks identified at this time.                      

- Family history:: not pertinent.                                                                 

                                                                                                  

                                                                                                  

Screening:                                                                                        

15:12 Abuse screen: Denies threats or abuse. Denies injuries from another. Nutritional        bp  

      screening: No deficits noted. Tuberculosis screening: No symptoms or risk factors           

      identified. Fall Risk None identified.                                                      

                                                                                                  

Assessment:                                                                                       

15:00 General: Appears distressed, comfortable, obese, unkempt, Behavior is agitated,         bp  

      anxious. Pain: Unable to use pain scale. Does not appear to understand pain scale.          

      Neuro: Level of Consciousness is awake, confused, Oriented to none. Cardiovascular: No      

      deficits noted. Respiratory: Airway is patent Respiratory effort is even, unlabored,        

      Respiratory pattern is regular, symmetrical. GI: No signs and/or symptoms were reported     

      involving the gastrointestinal system. : No signs and/or symptoms were reported           

      regarding the genitourinary system. EENT: No deficits noted. Derm: No deficits noted.       

      Musculoskeletal: Circulation, motion, and sensation intact. Range of motion: intact in      

      all extremities.                                                                            

17:00 Reassessment: ALL CURRENT ORDERS COMPLETED. TRANSFER IN PROCESS.                        bp  

18:33 Reassessment: NURSE TO NURSE REPORT TO AFSANEH SEXTON AT [facility].                   bp  

18:47 Reassessment: Doc to Doc given to Dr. Caro at [facility] by Dr. Saravia.         iw  

19:00 Reassessment: Patient appears in no apparent distress at this time. sleeping arouse     rr5 

      easily and calm, awaiting for EMS for the transfer to [facility].              

19:50 Reassessment: Patient appears in no apparent distress at this time. EMS Conrad arrived   rr5 

      gave endorsement,vitally stable,able to stand and walk at bedside. calm cooperative.        

                                                                                                  

Psych:                                                                                            

15:00 Subjective: Patient's mood is irritable, Delusions are UNKNOWN Hallucinations are       bp  

      suspected, Having thoughts of DOES NOT ANSWER. Objective: Patient is irritable,             

      restless, Speech is incoherent, rambling, rapid, Affect is blunted. Interventions:          

      Removed personal items and placed in bag. Patient placed in hospital gown. Searched         

      person for dangerous items. Urine collected and sent for urine drug test. Suicide Risk      

      Assessment: Sad Person Scale: Sex of patient: Female: Score 0 points. Age of patient:       

      Score 0 point if patient falls outside of specified age parameters. Depression: Score 0     

      point if signs of depression are not present. Previous Attempt: Score 0 point if            

      patient has not previously attempted suicide. Substance Abuse: Score 0 point if patient     

      does not abuse alcohol or drugs. Rational Thinking: Score 1 point if patient is lacking     

      rational thinking. Social Support: Score 0 if social support is present/available.          

      Organized Plan: Score 0 if patient did not have an organized plan in place.                 

      Relationship: Score 1 point if patient is , , , or for a single     

      male Chronic Sickness: Score 0 point if patient does not have a chronic illness,            

      debilitating, or severe disorder. TOTAL POINTS: If total points are 0-2, proposed           

      clinical action is to send home with follow-up. Safety Checks: Personal items have been     

      removed. Door is open. No visitors are present at this time. Pt denies substance abuse.     

      Commitment: Patient will be an involuntary commitment. Commitment papers completed.         

                                                                                                  

Vital Signs:                                                                                      

15:06  / 87; Pulse 85; Resp 22; Temp 97.8; Pulse Ox 96% ; Weight 113.4 kg;              bp  

17:02  / 80; Pulse 71; Resp 18; Temp 97.8(O); Pulse Ox 98% on R/A;                      mh5 

18:04  / 70; Pulse 72; Resp 18; Temp 97.6(O); Pulse Ox 97% on R/A;                      mh5 

19:01  / 87; Pulse 65; Resp 20; Pulse Ox 100% ;                                         mh5 

19:34  / 95; Pulse 66; Resp 16; Pulse Ox 99% on R/A;                                    mt  

                                                                                                  

ED Course:                                                                                        

15:00 Safety checks: Items removed: Door open/sign placed on door: yes. Family/friend         mh5 

      present: no. Sitter present: Yes.                                                           

15:06 Patient arrived in ED.                                                                  ss  

15:06 Fritz Bansal, RN is Primary Nurse.                                                    bp  

15:06 Arm band placed on.                                                                     bp  

15:08 Triage completed.                                                                       bp  

15:08 Syed Saravia MD is Attending Physician.                                             luh 

15:12 Patient has correct armband on for positive identification. Bed in low position. Call   bp  

      light in reach. Side rails up X2.                                                           

15:15 Safety checks: Items removed: yes. Door open/sign placed on door: yes. Family/friend    mh5 

      present: no. Sitter present: Yes.                                                           

15:30 Safety checks: Items removed: yes. Door open/sign placed on door: yes. Family/friend    mh5 

      present: no. Sitter present: Yes.                                                           

15:31 EKG done, by EKG tech. reviewed by Syed Saravia MD.                                   3 

15:45 Safety checks: Items removed: yes. Door open/sign placed on door: yes. Family/friend    mh5 

      present: no. Sitter present: Yes.                                                           

15:56 Initial lab(s) drawn, by me, sent to lab. Maintain EMS IV. Dressing intact.             5 

15:57 Warm blanket given.                                                                     5 

15:57 Acetaminophen Sent.                                                                     5 

15:57 Basic Metabolic Panel Sent.                                                             5 

15:57 CBC with Diff Sent.                                                                     5 

15:57 ETOH Level Sent.                                                                        5 

15:57 Hepatic Function Sent.                                                                  5 

15:57 PT-INR Sent.                                                                            5 

15:57 Ptt, Activated Sent.                                                                    5 

15:57 Salicylate Sent.                                                                        5 

15:57 Urine Drug Screen Sent.                                                                 5 

16:00 Safety checks: Items removed: yes. Door open/sign placed on door: yes. Family/friend    mh5 

      present: no. Sitter present: Yes.                                                           

16:15 Safety checks: Items removed: yes. Door open/sign placed on door: yes. Family/friend    mh5 

      present: no. Sitter present: Yes.                                                           

16:24 Depakote Sent.                                                                          5 

16:30 Safety checks: Items removed: yes. Door open/sign placed on door: yes. Family/friend    mh5 

      present: no. Sitter present: Yes.                                                           

16:45 Safety checks: Items removed: yes. Door open/sign placed on door: yes. Family/friend    mh5 

      present: no. Sitter present: Yes.                                                           

17:00 Safety checks: Items removed: yes. Door open/sign placed on door: yes. Family/friend    mh5 

      present: no. Sitter present: Yes.                                                           

17:03 Straight cath inserted, using sterile technique, 16 Fr. Specimen obtained.              5 

17:03 Urine collected: clean catch specimen, tea colored.                                     5 

17:04 Urine Drug Screen Sent.                                                                 [facility] 

17:15 Safety checks: Items removed: yes. Door open/sign placed on door: yes. Family/friend    mh5 

      present: no. Sitter present: Yes.                                                           

17:30 Safety checks: Items removed: yes. Door open/sign placed on door: yes. Family/friend    mh5 

      present: no. Sitter present: Yes.                                                           

17:45 Safety checks: Items removed: yes. Door open/sign placed on door: yes. Family/friend    mh5 

      present: no. Sitter present: Yes.                                                           

18:00 Safety checks: Items removed: yes. Door open/sign placed on door: yes. Family/friend    mh5 

      present: no. Sitter present: Yes.                                                           

18:15 Safety checks: Items removed: yes. Door open/sign placed on door: yes. Family/friend    mh5 

      present: no. Sitter present: Yes. Safety checks: Items removed: yes.                        

18:30 Safety checks: Items removed: yes. Door open/sign placed on door: yes. Family/friend    mh5 

      present: no. Sitter present: Yes.                                                           

18:45 Safety checks: Items removed: yes. Door open/sign placed on door: yes. Family/friend    mh5 

      present: no. Sitter present: Yes.                                                           

19:00 Safety Checks: Personal items have been removed. The door is open or patient has been   rr5 

      placed in a hallway bed/chair. Sitter present at this time.                                 

19:00 Safety checks: Items removed: yes. Door open/sign placed on door: yes. Family/friend    mt  

      present: no. Sitter present: Yes. Other: Kylee, [employer], sitting one on one with            

      patient.                                                                                    

19:15 Safety checks: Items removed: yes. Door open/sign placed on door: yes. Family/friend    mt  

      present: no. Sitter present: Yes.                                                           

19:30 Safety checks: Items removed: yes. Door open/sign placed on door: yes. Family/friend    mt  

      present: no. Sitter present: Yes.                                                           

19:53 No provider procedures requiring assistance completed. IV discontinued, intact,         rr5 

      bleeding controlled, No redness/swelling at site. Pressure dressing applied.                

                                                                                                  

Administered Medications:                                                                         

15:54 Not Given (Patient Refused): NS 0.9% 500 ml IV at bolus once                            bp  

16:16 Drug: Geodon 20 mg Route: IM; Site: left deltoid;                                       bp  

18:34 Follow up: Response: Anxiety decreased                                                  bp  

16:17 Drug: Ativan 2 mg Route: IM; Site: right deltoid;                                       bp  

18:34 Follow up: Response: No adverse reaction; Anxiety decreased                             bp  

                                                                                                  

                                                                                                  

Outcome:                                                                                          

16:09 ER care complete, transfer ordered by MD. arvizu 

19:53 Transferred by ground EMS to other acute care facility: [facility].   rr5 

      Transfer form completed.                                                                    

19:53 Condition: stable                                                                           

19:53 Instructed on the need for transfer.                                                        

19:54 Patient left the ED.                                                                    rr5 

                                                                                                  

Signatures:                                                                                       

Syed Saravia MD MD cha Williams, Irene, RN RN                                                      

Cathi Garza RN                      RN                                                      

Chari Goncalves                              [facility]                                                  

[facility]                                                   

Fritz Bansal RN                      RN                                                      

Earline Stack                              [facility]                                                  

Igor Cifuentes, RN                      RN   rr5                                                  

                                                                                                  

Corrections: (The following items were deleted from the chart)                                    

15:11 15:06  / 87; Pulse 85bpm; Resp 22bpm; Pulse Ox 96%; ss                            bp  

17:10 17:02  / 80; Pulse 71bpm; Resp 18bpm; Pulse Ox 98% RA; [place] [id number] 

17:29 15:09 Home Meds: pt states she takes medictions but does not know what they are; bp     iw  

18:49 18:47 Reassessment: Doc-Doc given to Dr. Caro at [facility] iw                    iw  

19:34 19:00 Safety checks: Items removed: yes. Door open/sign placed on door: yes.            mt  

      Family/friend present: no. Sitter present: Yes. 5                                         

                                                                                                  

**************************************************************************************************

## 2019-03-21 NOTE — EKG
Test Date:    2019-03-20               Test Time:    15:20:52

Technician:   ZENAIDA                                     

                                                     

MEASUREMENT RESULTS:                                       

Intervals:                                           

Rate:         79                                     

FL:           126                                    

QRSD:         96                                     

QT:           358                                    

QTc:          410                                    

Axis:                                                

P:            29                                     

FL:           126                                    

QRS:          39                                     

T:            53                                     

                                                     

INTERPRETIVE STATEMENTS:                                       

                                                     

Normal sinus rhythm

Normal ECG

Compared to ECG 06/17/2016 00:28:16

Atrial abnormality no longer present



Electronically Signed On 03-21-19 05:57:53 CDT by Lokesh Torres

## 2019-08-15 ENCOUNTER — HOSPITAL ENCOUNTER (INPATIENT)
Dept: HOSPITAL 97 - ER | Age: 59
LOS: 2 days | Discharge: PSYCHIATRIC OR SUBSTANCE USE FACILITY | DRG: 885 | End: 2019-08-17
Attending: HOSPITALIST | Admitting: HOSPITALIST
Payer: COMMERCIAL

## 2019-08-15 VITALS — BODY MASS INDEX: 31.8 KG/M2

## 2019-08-15 DIAGNOSIS — F23: Primary | ICD-10-CM

## 2019-08-15 DIAGNOSIS — F32.9: ICD-10-CM

## 2019-08-15 DIAGNOSIS — N39.0: ICD-10-CM

## 2019-08-15 PROCEDURE — 80076 HEPATIC FUNCTION PANEL: CPT

## 2019-08-15 PROCEDURE — 96372 THER/PROPH/DIAG INJ SC/IM: CPT

## 2019-08-15 PROCEDURE — 80320 DRUG SCREEN QUANTALCOHOLS: CPT

## 2019-08-15 PROCEDURE — 85730 THROMBOPLASTIN TIME PARTIAL: CPT

## 2019-08-15 PROCEDURE — 87086 URINE CULTURE/COLONY COUNT: CPT

## 2019-08-15 PROCEDURE — 87077 CULTURE AEROBIC IDENTIFY: CPT

## 2019-08-15 PROCEDURE — 93005 ELECTROCARDIOGRAM TRACING: CPT

## 2019-08-15 PROCEDURE — 80307 DRUG TEST PRSMV CHEM ANLYZR: CPT

## 2019-08-15 PROCEDURE — 36415 COLL VENOUS BLD VENIPUNCTURE: CPT

## 2019-08-15 PROCEDURE — 80329 ANALGESICS NON-OPIOID 1 OR 2: CPT

## 2019-08-15 PROCEDURE — 81003 URINALYSIS AUTO W/O SCOPE: CPT

## 2019-08-15 PROCEDURE — 87088 URINE BACTERIA CULTURE: CPT

## 2019-08-15 PROCEDURE — 85025 COMPLETE CBC W/AUTO DIFF WBC: CPT

## 2019-08-15 PROCEDURE — 99285 EMERGENCY DEPT VISIT HI MDM: CPT

## 2019-08-15 PROCEDURE — 85610 PROTHROMBIN TIME: CPT

## 2019-08-15 PROCEDURE — 87186 SC STD MICRODIL/AGAR DIL: CPT

## 2019-08-15 PROCEDURE — 80048 BASIC METABOLIC PNL TOTAL CA: CPT

## 2019-08-15 PROCEDURE — 81015 MICROSCOPIC EXAM OF URINE: CPT

## 2019-08-15 PROCEDURE — 51702 INSERT TEMP BLADDER CATH: CPT

## 2019-08-15 PROCEDURE — 70450 CT HEAD/BRAIN W/O DYE: CPT

## 2019-08-16 LAB
ALBUMIN SERPL BCP-MCNC: 3.7 G/DL (ref 3.4–5)
ALP SERPL-CCNC: 188 U/L (ref 45–117)
ALT SERPL W P-5'-P-CCNC: 25 U/L (ref 12–78)
AST SERPL W P-5'-P-CCNC: 25 U/L (ref 15–37)
BUN BLD-MCNC: 14 MG/DL (ref 7–18)
GLUCOSE SERPLBLD-MCNC: 125 MG/DL (ref 74–106)
HCT VFR BLD CALC: 39.4 % (ref 36–45)
INR BLD: 1.19
LYMPHOCYTES # SPEC AUTO: 0.7 K/UL (ref 0.7–4.9)
METHAMPHET UR QL SCN: NEGATIVE
PMV BLD: 9.6 FL (ref 7.6–11.3)
POTASSIUM SERPL-SCNC: 3.3 MMOL/L (ref 3.5–5.1)
RBC # BLD: 4.33 M/UL (ref 3.86–4.86)
THC SERPL-MCNC: NEGATIVE NG/ML
UA COMPLETE W REFLEX CULTURE PNL UR: (no result)

## 2019-08-16 RX ADMIN — SODIUM CHLORIDE SCH MLS: 0.9 INJECTION, SOLUTION INTRAVENOUS at 15:46

## 2019-08-16 RX ADMIN — RISPERIDONE SCH MG: 1 TABLET, FILM COATED ORAL at 09:58

## 2019-08-16 RX ADMIN — RISPERIDONE SCH MG: 1 TABLET, FILM COATED ORAL at 21:46

## 2019-08-16 RX ADMIN — ZIPRASIDONE HYDROCHLORIDE SCH: 20 CAPSULE ORAL at 06:00

## 2019-08-16 RX ADMIN — SODIUM CHLORIDE SCH MLS: 0.9 INJECTION, SOLUTION INTRAVENOUS at 06:02

## 2019-08-16 RX ADMIN — ZIPRASIDONE HYDROCHLORIDE SCH MG: 20 CAPSULE ORAL at 21:45

## 2019-08-16 RX ADMIN — Medication SCH ML: at 21:45

## 2019-08-16 RX ADMIN — SULFAMETHOXAZOLE AND TRIMETHOPRIM SCH TAB: 800; 160 TABLET ORAL at 21:46

## 2019-08-16 RX ADMIN — Medication SCH: at 09:00

## 2019-08-16 RX ADMIN — ZIPRASIDONE HYDROCHLORIDE SCH MG: 20 CAPSULE ORAL at 08:30

## 2019-08-16 NOTE — P.HP
Certification for Inpatient


Patient admitted to: Inpatient


With expected LOS: >2 Midnights


Patient will require the following post-hospital care: None


Practitioner: I am a practitioner with admitting privileges, knowledge of 

patient current condition, hospital course, and medical plan of care.


Services: Services provided to patient in accordance with Admission 

requirements found in Title 42 Section 412.3 of the Code of Federal Regulations





Patient History


Date of Service: 08/16/19


Reason for admission: Acute psychosis


History of Present Illness: 





Patient is a 58-year-old female with a history of schizophrenia who came to the 

hospital with altered mentation.  Patient was acutely psychotic.  Patient was 

given ketamine in the ER and fell asleep.  Patient will be monitored in the ICU 

at this time.  Patient has been combative.  She is acutely psychotic.  She was 

a started on antipsychotic medication.  Once she comes around from the ketamine 

we will attempt transfer to inpatient psychiatry.  Patient will be monitored in 

ICU and will need a one-to-one sitter.


Allergies





benztropine mesylate [From Cogentin] Allergy (Intermediate, Verified 05/23/13 15

:50)


 Shortness of breath


erythromycin base [Erythromycin Base] Allergy (Intermediate, Verified 05/23/13 

15:49)


 Nausea/Vomiting


buspirone HCl [From BuSpar] Allergy (Verified 02/06/13 03:21)


 DIZZINESS


astemizole [From Hismanal] Adverse Reaction (Intermediate, Verified 10/03/12 18:

08)


 EYE TWITCH


codeine [Codeine] Adverse Reaction (Intermediate, Verified 10/03/12 18:07)


 NOSE ITCHES


haloperidol [From Haldol] Adverse Reaction (Intermediate, Verified 10/03/12 18:

09)


 HYPER


haloperidol lactate [From Haldol] Adverse Reaction (Intermediate, Verified 10/03

/12 18:09)


 HYPER


azithromycin [From Zithromax] Adverse Reaction (Verified 02/06/13 03:22)


 STOMACH PAIN


antihistamines Allergy (Intermediate, Uncoded 05/23/13 15:50)


 Nausea/Vomiting


Erythromycin Allergy (Uncoded 01/27/16 17:49)


 Unknown


No Allergy Allergy (Uncoded 06/17/16 08:16)


 Unknown


No Allergy Informatio Allergy (Uncoded 03/09/16 11:55)


 Unknown





Home Medications: 








Amitriptyline [Elavil*] 50 mg PO BID PRN 08/10/14 


Bupropion HCl [Wellbutrin Xl] 150 mg PO DAILY 08/10/14 


Loxapine Succinate [Loxitane] 75 mg PO BEDTIME 08/10/14 


risperiDONE [Risperdal 1 mg tab*] 3 mg PO BID 08/10/14 


PARoxetine HCl [Paxil] 20 mg PO DAILY 03/09/16 








- Past Medical/Surgical History


Diabetic: No


-: DEPRESSION


-: CHEST PAIN


-: SCHIZOPHRENIA


-: PSYCHOSIS


-: SMOKER


-: COLON SX REMOVAL OF POLYP


-: LEFT FOOT BUNION SURGERY


-: LINETTE


-: CYST REMOVAL NECK





- Family History


  ** Mother


Medical History: Heart disease, Hypertension, GI disease





  ** Brother


Medical History: Liver disease





  ** Father


Medical History: Heart disease, Hypertension, GI disease





- Social History


Smoking Status: Unknown if ever smoked


Alcohol use: No


CD- Drugs: No


Caffeine use: Yes





Review of Systems


10-point ROS is otherwise unremarkable





Physical Examination





- Vital Signs


Temperature: 97.3 F


Blood Pressure: 106/63


Pulse: 72


Respirations: 14


Pulse Ox (%): 92





- Physical Exam


General: Unresponsive


HEENT: Atraumatic, PERRLA, Mucous membr. moist/pink, EOMI, Sclerae nonicteric


Neck: Supple, 2+ carotid pulse no bruit, No LAD, Without JVD or thyroid 

abnormality


Respiratory: Clear to auscultation bilaterally, Normal air movement


Cardiovascular: Regular rate/rhythm, Normal S1 S2, No murmurs


Gastrointestinal: Normal bowel sounds, Soft and benign, Non-distended, No 

tenderness


Musculoskeletal: No tenderness


Integumentary: No rashes


Neurological: Other (Neurological a difficult to examine as patient is 

unresponsive from the ketamine), Abnormal gait, Abnormal strength


Lymphatics: No axilla or inguinal lymphadenopathy





- Studies


Laboratory Data (last 24 hrs)





08/15/19 23:41: PT 13.9 H, INR 1.19, APTT 34.7


08/15/19 23:41: WBC 9.7, Hgb 13.2, Hct 39.4, Plt Count 278


08/15/19 23:41: Sodium 142, Potassium 3.3 L, BUN 14, Creatinine 0.81, Glucose 

125 H, Total Bilirubin 0.6, AST 25, ALT 25, Alkaline Phosphatase 188 H








Assessment & Plan





- Problems (Diagnosis)


(1) Acute psychosis


Current Visit: Yes   Status: Acute   





(2) Altered mental status


Onset Date: 01/28/16   Current Visit: No   Status: Acute   





(3) Schizophrenia


Onset Date: 01/28/16   Current Visit: No   Status: Acute   





(4) UTI (urinary tract infection)


Onset Date: 03/14/16   Current Visit: No   Status: Acute   





- Plan





Plan:


1. Resume anti psychotic 


2. IV hydration


3. Monitor in ICU with a one-to-one sitter


4. Transfer to inpatient psych when medically stable


5. GI and DVT prophylaxis


Discharge Plan: Home


Plan to discharge in: Greater than 2 days





- Advance Directives


Does patient have a Living Will: No


Does patient have a Durable POA for Healthcare: Yes





- Code Status/Comfort Care


Code Status Assessed: Yes


Code Status: Full Code


Critical Care: No


Time Spent Managing PTS Care (In Minutes): 45

## 2019-08-16 NOTE — RAD REPORT
EXAM DESCRIPTION:  CT - Head Brain Wo Cont - 8/16/2019 3:28 am

 

CLINICAL HISTORY:  The patient is 58 years old and is Female; CONFUSED

 

TECHNIQUE:  Axial computed tomography images of the head/brain without intravenous contrast.   Sagitt
al and coronal reformatted images were created and reviewed.   This CT exam was performed using one o
r more of the following dose reduction techniques:   automated exposure control, adjustment of the mA
 and/or kV according to patient size, and/or use of iterative reconstruction technique.

 

COMPARISON:  No relevant prior studies available.

 

FINDINGS:  BRAIN:   Unremarkable.   The gray-white matter differentiation is preserved . No hemorrhag
e.   No significant white matter disease.   No edema. No extra-axial fluid collections.

   VENTRICLES:   Unremarkable.   No ventriculomegaly.

   BONES/JOINTS:   No acute fracture.

   SOFT TISSUES:   Unremarkable.

   SINUSES:   Unremarkable as visualized.   No acute sinusitis.

   MASTOID AIR CELLS:   Unremarkable as visualized.   No mastoid effusion.

 

IMPRESSION:  No acute intracranial findings.

 

Electronically signed by:   Zara Burns MD   8/16/2019 12:07 AM CDT Workstation: [id number]

 

 

Due to temporary technical issues with the PACS/Fluency reporting system, reports are being signed by
 the in house radiologist as a courtesy to ensure prompt reporting. The interpreting radiologist is f
ully responsible for the content of the report.

## 2019-08-16 NOTE — EKG
Test Date:    2019-08-15               Test Time:    23:58:08

Technician:   ERMA                                     

                                                     

MEASUREMENT RESULTS:                                       

Intervals:                                           

Rate:         86                                     

OR:           144                                    

QRSD:         98                                     

QT:           374                                    

QTc:          447                                    

Axis:                                                

P:            61                                     

OR:           144                                    

QRS:          57                                     

T:            47                                     

                                                     

INTERPRETIVE STATEMENTS:                                       

                                                     

Normal sinus rhythm

Possible Left atrial enlargement

Borderline ECG

Compared to ECG 03/20/2019 15:20:52

No significant changes



Electronically Signed On 08-16-19 11:44:51 CDT by Boyd Urrutia
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
## 2019-08-16 NOTE — EDPHYS
Physician Documentation                                                                           

 [facility]                                                                 

Name: Marcia Peña                                                                              

Age: 58 yrs                                                                                       

Sex: Female                                                                                       

: 1960                                                                                   

MRN: [id number]                                                                                   

Arrival Date: 08/15/2019                                                                          

Time: 23:36                                                                                       

Account#: [id number]                                                                            

Bed 2                                                                                             

Private MD:                                                                                       

ED Physician Dominic Alicia                                                                     

HPI:                                                                                              

                                                                                             

03:01 This 58 yrs old  Female presents to ER via EMS with complaints of Altered      tw4 

      Mental Status.                                                                              

03:01 The patient presents with decreased responsiveness, disorientation. Onset: The          tw4 

      symptoms/episode began/occurred today. Associated signs and symptoms: The patient has       

      no apparent associated signs or symptoms. The patient has not experienced similar           

      symptoms in the past.                                                                       

                                                                                                  

Historical:                                                                                       

- Allergies:                                                                                      

08/15                                                                                             

23:51 Codeine;                                                                                jd3 

23:51 Erythromycin;                                                                           jd3 

- Home Meds:                                                                                      

23:51 amitriptyline 100 mg Oral tab 1 tab 2 times per day [Active]; benztropine 0.5 mg Oral   jd3 

      tab 1 tab 2 times per day [Active]; Depakote  mg Oral Tb24 twice a day [Active];      

      loxapine succinate 25 mg Oral cap 3 times per day [Active]; paroxetine HCl 20 mg Oral       

      tab 1 tab once daily [Active]; risperidone 3 mg Oral tab 1 tab 2 times per day [Active];    

- PMHx:                                                                                           

23:51 Schizophrenia; Bipolar disorder;                                                        jd3 

- PSHx:                                                                                           

23:51 Unable to obtain;                                                                       jd3 

                                                                                                  

- Immunization history:: Adult Immunizations unknown.                                             

- Social history:: Smoking status: unknown.                                                       

- Ebola Screening: : Patient negative for fever greater than or equal to 101.5 degrees            

  Fahrenheit, and additional compatible Ebola Virus Disease symptoms.                             

                                                                                                  

                                                                                                  

ROS:                                                                                              

                                                                                             

03:01 Constitutional: Negative for fever, chills, and weight loss, Cardiovascular: Negative   tw4 

      for chest pain, palpitations, and edema, Respiratory: Negative for shortness of breath,     

      cough, wheezing, and pleuritic chest pain, Abdomen/GI: Negative for abdominal pain,         

      nausea, vomiting, diarrhea, and constipation, Back: Negative for injury and pain.           

      Neuro: Positive for altered mental status.                                                  

                                                                                                  

Exam:                                                                                             

03:01 Head/Face:  Normocephalic, atraumatic. Chest/axilla:  Normal chest wall appearance and  tw4 

      motion.  Nontender with no deformity.  No lesions are appreciated. Cardiovascular:          

      Regular rate and rhythm with a normal S1 and S2.  No gallops, murmurs, or rubs.  Normal     

      PMI, no JVD.  No pulse deficits. Respiratory:  Lungs have equal breath sounds               

      bilaterally, clear to auscultation and percussion.  No rales, rhonchi or wheezes noted.     

       No increased work of breathing, no retractions or nasal flaring. Abdomen/GI:  Soft,        

      non-tender, with normal bowel sounds.  No distension or tympany.  No guarding or            

      rebound.  No evidence of tenderness throughout. Skin:  Warm, dry with normal turgor.        

      Normal color with no rashes, no lesions, and no evidence of cellulitis. MS/ Extremity:      

      Pulses equal, no cyanosis.  Neurovascular intact.  Full, normal range of motion.            

03:01 Constitutional: The patient appears comatose.                                               

03:01 Neuro: Orientation: unable to test, the patient is comatose, Mentation: somnolent.          

                                                                                                  

Vital Signs:                                                                                      

08/15                                                                                             

23:51  / 78; Pulse 99; Resp 22 S; Pulse Ox 91% on R/A; Weight 72.57 kg (R); Height 5    jd3 

      ft. 3 in. (160.02 cm) (R); Pain 0/10;                                                       

23:51 Pulse Ox 97% on 2 lpm NC;                                                               jd3 

0816                                                                                             

00:52  / 78; Pulse 84; Resp 15 S; Temp 98.2(R); Pulse Ox 100% on 2 lpm NC; Pain 0/10;   jd3 

01:56  / 63; Pulse 77; Resp 14 S; Pulse Ox 97% on 2 lpm NC; Pain 0/10;                  jd3 

03:04  / 71; Pulse 71; Resp 15 S; Pulse Ox 99% on R/A; Pain 0/10;                       jd3 

04:15  / 82; Pulse 79; Resp 17 S; Pulse Ox 100% on R/A;                                 jd3 

05:15  / 84; Pulse 82; Resp 18 S; Pulse Ox 100% on R/A;                                 jd3 

08/15                                                                                             

23:51 Body Mass Index 28.34 (72.57 kg, 160.02 cm)                                             jd3 

                                                                                                  

[place] Coma Score:                                                                               

03:01 Eye Response: to pain(2). Verbal Response: incomprehensible(2). Motor Response:         tw4 

      withdraws from pain(4). Total: 8.                                                           

                                                                                                  

MDM:                                                                                              

08/15                                                                                             

23:36 Patient medically screened.                                                                                                                                                          

03:08 Differential Diagnosis: electrolyte abnormality, alcohol intoxication. Data reviewed:   [facility] 

      vital signs, nurses notes. Counseling: I had a detailed discussion with the patient         

      and/or guardian regarding: the historical points, exam findings, and any diagnostic         

      results supporting the discharge/admit diagnosis.                                           

                                                                                                  

08/15                                                                                             

23:38 Order name: Acetaminophen                                                               [facility] 

08/15                                                                                             

23:38 Order name: Basic Metabolic Panel                                                       [facility] 

08/15                                                                                             

23:38 Order name: CBC with Diff; Complete Time: 01:33                                         [facility] 

                                                                                             

01:34 Interpretation: Normal except: CALVIN% 84.0; LYM% 6.8.                                     [facility] 

08/15                                                                                             

23:38 Order name: ETOH Level; Complete Time: 01:33                                            [facility] 

08/15                                                                                             

23:38 Order name: Hepatic Function; Complete Time: 01:33                                      [facility] 

08/15                                                                                             

23:38 Order name: PT-INR; Complete Time: :33                                                [facility] 

08/15                                                                                             

23:38 Order name: Ptt, Activated; Complete Time: 01:33                                        [facility] 

08/15                                                                                             

23:38 Order name: Salicylate; Complete Time: 01:33                                            [facility] 

08/15                                                                                             

23:38 Order name: Urine Drug Screen                                                           [facility] 

08/15                                                                                             

23:39 Order name: Acetaminophen Level; Complete Time: 01:33                                   [facility]

08/15                                                                                             

23:40 Order name: Basic Metabolic Panel; Complete Time: 01:33                                 [facility]

                                                                                             

00:56 Order name: Urine Microscopic Only; Complete Time: 02:53                                [facility] 

                                                                                             

00:56 Order name: Urine Culture                                                               [facility] 

                                                                                             

00:56 Order name: Urine Dipstick--Ancillary (enter results); Complete Time: 01:33             [facility] 

08/15                                                                                             

23:38 Order name: EKG; Complete Time: 23:40                                                   [facility] 

08/15                                                                                             

23:38 Order name: EKG - Nurse/Tech; Complete Time: 01:41                                      [facility] 

08/15                                                                                             

23:38 Order name: IV Saline Lock; Complete Time: 23:43                                        [facility] 

08/15                                                                                             

23:38 Order name: Labs collected and sent; Complete Time: 23:43                               [facility] 

08/15                                                                                             

23:38 Order name: Urine Dipstick-Ancillary (obtain specimen); Complete Time: 00:48            [facility] 

08/15                                                                                             

23:38 Order name: CT Head Brain wo Cont                                                       tw4 

                                                                                             

00:48 Order name: Mandi; Complete Time: 00:48                                                 jd3 

                                                                                             

04:26 Order name: CONS Pharmacy Consult                                                       EDMS

                                                                                             

04:26 Order name: Regular                                                                     EDMS

                                                                                                  

EC:13 Rate is 86 beats/min. Rhythm is regular. QRS Axis is Normal. NH interval is normal. QRS tw4 

      interval is normal. QT interval is normal. No Q waves. T waves are Normal. No ST            

      changes noted. Clinical impression: NSR w/ Non-specific ST/T Changes. Interpreted by        

      me. Reviewed by me.                                                                         

                                                                                                  

Administered Medications:                                                                         

04:30 Drug: Geodon 10 mg Route: IM; Site: right deltoid;                                      lp1 

05:30 Follow up: Response: No adverse reaction                                                jd3 

05:20 Drug: Geodon 10 mg Route: IM; Site: left deltoid;                                       jd3 

05:45 Follow up: Response: No adverse reaction                                                jd3 

                                                                                                  

                                                                                                  

Disposition:                                                                                      

19 02:23 Hospitalization ordered by Maddie Acuna for Observation. Preliminary             

  diagnosis are Altered mental status, unspecified, Unspecified psychosis not                     

  due to a substance or known physiological condition.                                            

- Bed requested for Intensive Care Unit.                                                          

- Status is Observation.                                                                      jd3 

- Condition is Stable.                                                                            

- Problem is an ongoing problem.                                                                  

- Symptoms are unchanged.                                                                         

UTI on Admission? No                                                                              

                                                                                                  

                                                                                                  

                                                                                                  

Signatures:                                                                                       

Dispatcher [employer]                           EDMS                                                 

Galina Nichole RN                         RN   lp1                                                  

Chelsea De Jesus RN RN   cg                                                   

Eduardo Montanez RN                    RN   jd3                                                  

Dominic Alicia MD MD   tw4                                                  

                                                                                                  

Corrections: (The following items were deleted from the chart)                                    

01:34 01:34 Normal except: CALVIN% 84.0. tw4                                                     tw4 

04:46 02:23 Hospitalization Ordered by Maddie Acuna MD for Observation. Preliminary          cg  

      diagnosis is Altered mental status, unspecified; Unspecified psychosis not due to a         

      substance or known physiological condition. Bed requested for Telemetry/MedSurg             

      (Inpatient). Status is Observation. Condition is Stable. Problem is an ongoing problem.     

      Symptoms are unchanged. UTI on Admission? No. tw4                                           

05:46 04:46 2019 02:23 Hospitalization Ordered by Maddie Acuna MD for Observation.     jd3 

      Preliminary diagnosis is Altered mental status, unspecified; Unspecified psychosis not      

      due to a substance or known physiological condition. Bed requested for Intensive Care       

      Unit. Status is Observation. Condition is Stable. Problem is an ongoing problem.            

      Symptoms are unchanged. UTI on Admission? No. cg                                            

                                                                                                  

**************************************************************************************************

## 2019-08-17 VITALS — OXYGEN SATURATION: 98 %

## 2019-08-17 VITALS — TEMPERATURE: 98.2 F

## 2019-08-17 VITALS — SYSTOLIC BLOOD PRESSURE: 124 MMHG | DIASTOLIC BLOOD PRESSURE: 76 MMHG

## 2019-08-17 RX ADMIN — SODIUM CHLORIDE SCH MLS: 0.9 INJECTION, SOLUTION INTRAVENOUS at 02:46

## 2019-08-17 RX ADMIN — SULFAMETHOXAZOLE AND TRIMETHOPRIM SCH TAB: 800; 160 TABLET ORAL at 09:03

## 2019-08-17 RX ADMIN — Medication SCH: at 09:00

## 2019-08-17 RX ADMIN — SODIUM CHLORIDE SCH: 0.9 INJECTION, SOLUTION INTRAVENOUS at 11:00

## 2019-08-17 NOTE — PN
The patient is medically stable.  I spoke to psychiatrist for transfer, explained the clinical situat
ion as well as her stability.  Patient has been accessed for transfer.  She will be transferred as we
ll as paperwork and acceptancies completed.





BARB/KARIN

DD:  08/17/2019 16:44:46Voice ID:  [id number]

DT:  08/17/2019 18:57:23Report ID:  [id number]

## 2019-10-15 ENCOUNTER — HOSPITAL ENCOUNTER (INPATIENT)
Dept: HOSPITAL 97 - ER | Age: 59
LOS: 16 days | Discharge: NURSING, REHAB OR LONG-TERM CARE | DRG: 101 | End: 2019-10-31
Attending: INTERNAL MEDICINE | Admitting: INTERNAL MEDICINE
Payer: COMMERCIAL

## 2019-10-15 VITALS — BODY MASS INDEX: 25.8 KG/M2

## 2019-10-15 DIAGNOSIS — E87.1: ICD-10-CM

## 2019-10-15 DIAGNOSIS — E87.6: ICD-10-CM

## 2019-10-15 DIAGNOSIS — F31.60: ICD-10-CM

## 2019-10-15 DIAGNOSIS — S60.511A: ICD-10-CM

## 2019-10-15 DIAGNOSIS — G40.909: Primary | ICD-10-CM

## 2019-10-15 DIAGNOSIS — S60.512A: ICD-10-CM
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
## 2019-10-15 DIAGNOSIS — L08.9: ICD-10-CM

## 2019-10-15 DIAGNOSIS — F20.9: ICD-10-CM
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
## 2019-10-15 LAB
ALBUMIN SERPL BCP-MCNC: 3 G/DL (ref 3.4–5)
ALP SERPL-CCNC: 186 U/L (ref 45–117)
ALT SERPL W P-5'-P-CCNC: 18 U/L (ref 12–78)
AST SERPL W P-5'-P-CCNC: 12 U/L (ref 15–37)
BUN BLD-MCNC: 2 MG/DL (ref 7–18)
CKMB CREATINE KINASE MB: 1.8 NG/ML (ref 0.3–3.6)
GLUCOSE SERPLBLD-MCNC: 134 MG/DL (ref 74–106)
HCT VFR BLD CALC: 33.3 % (ref 36–45)
INR BLD: 1.1
LIPASE SERPL-CCNC: 51 U/L (ref 73–393)
LYMPHOCYTES # SPEC AUTO: 1.6 K/UL (ref 0.7–4.9)
MAGNESIUM SERPL-MCNC: 1.6 MG/DL (ref 1.8–2.4)
PMV BLD: 9.3 FL (ref 7.6–11.3)
POTASSIUM SERPL-SCNC: 3 MMOL/L (ref 3.5–5.1)
RBC # BLD: 3.74 M/UL (ref 3.86–4.86)
TROPONIN (EMERG DEPT USE ONLY): < 0.02 NG/ML (ref 0–0.04)

## 2019-10-15 PROCEDURE — 85025 COMPLETE CBC W/AUTO DIFF WBC: CPT

## 2019-10-15 PROCEDURE — 85730 THROMBOPLASTIN TIME PARTIAL: CPT

## 2019-10-15 PROCEDURE — 80053 COMPREHEN METABOLIC PANEL: CPT

## 2019-10-15 PROCEDURE — 96365 THER/PROPH/DIAG IV INF INIT: CPT

## 2019-10-15 PROCEDURE — 82550 ASSAY OF CK (CPK): CPT

## 2019-10-15 PROCEDURE — 83735 ASSAY OF MAGNESIUM: CPT

## 2019-10-15 PROCEDURE — 87086 URINE CULTURE/COLONY COUNT: CPT

## 2019-10-15 PROCEDURE — 80177 DRUG SCRN QUAN LEVETIRACETAM: CPT

## 2019-10-15 PROCEDURE — 71045 X-RAY EXAM CHEST 1 VIEW: CPT

## 2019-10-15 PROCEDURE — 97530 THERAPEUTIC ACTIVITIES: CPT

## 2019-10-15 PROCEDURE — 80076 HEPATIC FUNCTION PANEL: CPT

## 2019-10-15 PROCEDURE — 99285 EMERGENCY DEPT VISIT HI MDM: CPT

## 2019-10-15 PROCEDURE — 97112 NEUROMUSCULAR REEDUCATION: CPT

## 2019-10-15 PROCEDURE — 70450 CT HEAD/BRAIN W/O DYE: CPT

## 2019-10-15 PROCEDURE — 95819 EEG AWAKE AND ASLEEP: CPT

## 2019-10-15 PROCEDURE — 36415 COLL VENOUS BLD VENIPUNCTURE: CPT

## 2019-10-15 PROCEDURE — 84484 ASSAY OF TROPONIN QUANT: CPT

## 2019-10-15 PROCEDURE — 83690 ASSAY OF LIPASE: CPT

## 2019-10-15 PROCEDURE — 83605 ASSAY OF LACTIC ACID: CPT

## 2019-10-15 PROCEDURE — 82553 CREATINE MB FRACTION: CPT

## 2019-10-15 PROCEDURE — 72170 X-RAY EXAM OF PELVIS: CPT

## 2019-10-15 PROCEDURE — 80048 BASIC METABOLIC PNL TOTAL CA: CPT

## 2019-10-15 PROCEDURE — 85610 PROTHROMBIN TIME: CPT

## 2019-10-15 PROCEDURE — 74177 CT ABD & PELVIS W/CONTRAST: CPT

## 2019-10-15 PROCEDURE — 93005 ELECTROCARDIOGRAM TRACING: CPT

## 2019-10-15 PROCEDURE — 97161 PT EVAL LOW COMPLEX 20 MIN: CPT

## 2019-10-15 PROCEDURE — 87040 BLOOD CULTURE FOR BACTERIA: CPT

## 2019-10-15 PROCEDURE — 82947 ASSAY GLUCOSE BLOOD QUANT: CPT

## 2019-10-15 PROCEDURE — 81001 URINALYSIS AUTO W/SCOPE: CPT

## 2019-10-15 PROCEDURE — 80061 LIPID PANEL: CPT

## 2019-10-15 PROCEDURE — 84132 ASSAY OF SERUM POTASSIUM: CPT

## 2019-10-15 PROCEDURE — 87088 URINE BACTERIA CULTURE: CPT

## 2019-10-15 PROCEDURE — 96361 HYDRATE IV INFUSION ADD-ON: CPT

## 2019-10-15 PROCEDURE — 97116 GAIT TRAINING THERAPY: CPT

## 2019-10-15 RX ADMIN — POTASSIUM CHLORIDE SCH MLS: 200 INJECTION, SOLUTION INTRAVENOUS at 18:04

## 2019-10-15 RX ADMIN — SODIUM CHLORIDE SCH: 0.9 INJECTION, SOLUTION INTRAVENOUS at 17:00

## 2019-10-15 RX ADMIN — POTASSIUM CHLORIDE SCH MLS: 200 INJECTION, SOLUTION INTRAVENOUS at 20:18

## 2019-10-15 NOTE — EDPHYS
Physician Documentation                                                                           

 [facility]                                                                 

Name: Marcia Peña                                                                              

Age: 59 yrs                                                                                       

Sex: Female                                                                                       

: 1960                                                                                   

MRN: [id number]                                                                                   

Arrival Date: 10/15/2019                                                                          

Time: 10:46                                                                                       

Account#: [id number]                                                                            

Bed 3                                                                                             

Private MD: Guy Cheng R ED Physician Dominic Alicia                                                                     

HPI:                                                                                              

10/15                                                                                             

11:24 This 59 yrs old  Female presents to ER via EMS with complaints of Probable     tw4 

      Seizure.                                                                                    

11:24 The patient presents after having a single isolated seizure. Character of seizure(s):   tw4 

      Loss of consciousness: the patient experienced loss of consciousness, brief, Motor          

      activity: generalized, shaking all over, blank stare. Seizure onset: today. Context:        

      the seizure(s) was witnessed, by family. Seizure Hx: it is unknown whether or not the       

      patient has a previous seizure history. Associated injury: The patient did not suffer       

      any apparent associated injury. It is unknown whether or not the patient has had            

      similar symptoms in the past.                                                               

                                                                                                  

Historical:                                                                                       

- Allergies:                                                                                      

11:07 Codeine;                                                                                sg  

11:07 Erythromycin;                                                                           sg  

- Home Meds:                                                                                      

11:07 amitriptyline 100 mg Oral tab 1 tab 2 times per day [Active]; benztropine 0.5 mg Oral   sg  

      tab 1 tab 2 times per day [Active]; Depakote  mg Oral Tb24 twice a day [Active];      

      loxapine succinate 25 mg Oral cap 3 times per day [Active]; paroxetine HCl 20 mg Oral       

      tab 1 tab once daily [Active]; risperidone 3 mg Oral tab 1 tab 2 times per day [Active];    

- PMHx:                                                                                           

11:07 Bipolar disorder; Schizophrenia;                                                        sg  

- PSHx:                                                                                           

11:07 Unable to obtain;                                                                       sg  

                                                                                                  

- Immunization history:: Adult Immunizations unknown.                                             

- Social history:: Smoking status: unknown.                                                       

- Ebola Screening: : Patient negative for fever greater than or equal to 101.5 degrees            

  Fahrenheit, and additional compatible Ebola Virus Disease symptoms Patient denies               

  exposure to infectious person Patient denies travel to an Ebola-affected area in the            

  21 days before illness onset No symptoms or risks identified at this time.                      

                                                                                                  

                                                                                                  

ROS:                                                                                              

11:24 Constitutional: Negative for fever, chills, and weight loss, Eyes: Negative for injury, tw4 

      pain, redness, and discharge, Cardiovascular: Negative for chest pain, palpitations,        

      and edema, Respiratory: Negative for shortness of breath, cough, wheezing, and              

      pleuritic chest pain, Abdomen/GI: Negative for abdominal pain, nausea, vomiting,            

      diarrhea, and constipation, Back: Negative for injury and pain, MS/Extremity: Negative      

      for injury and deformity, Skin: Negative for injury, rash, and discoloration.               

11:24 Neuro: Positive for seizure activity, Negative for altered mental status, dizziness,        

      gait disturbance, headache, hearing loss, tinnitus, tremor, visual changes, weakness.       

                                                                                                  

Exam:                                                                                             

11:24 Constitutional:  This is a well developed, well nourished patient who is awake, alert,  tw4 

      and in no acute distress. Head/Face:  Normocephalic, atraumatic. Eyes:  Pupils equal        

      round and reactive to light, extra-ocular motions intact.  Lids and lashes normal.          

      Conjunctiva and sclera are non-icteric and not injected.  Cornea within normal limits.      

      Periorbital areas with no swelling, redness, or edema. Chest/axilla:  Normal chest wall     

      appearance and motion.  Nontender with no deformity.  No lesions are appreciated.           

      Cardiovascular:  Regular rate and rhythm with a normal S1 and S2.  No gallops, murmurs,     

      or rubs.  Normal PMI, no JVD.  No pulse deficits. Respiratory:  Lungs have equal breath     

      sounds bilaterally, clear to auscultation and percussion.  No rales, rhonchi or wheezes     

      noted.  No increased work of breathing, no retractions or nasal flaring. Abdomen/GI:        

      Soft, non-tender, with normal bowel sounds.  No distension or tympany.  No guarding or      

      rebound.  No evidence of tenderness throughout. Back:  No spinal tenderness.  No            

      costovertebral tenderness.  Full range of motion. MS/ Extremity:  Pulses equal, no          

      cyanosis.  Neurovascular intact.  Full, normal range of motion.                             

11:24 Neuro: Orientation: Not oriented to place, time, situation, Mentation: slow to respond,     

      confused, Memory: unable to test, Motor: moves all fours, Gait: unable to assess, the       

      patient refuses to ambulate.                                                                

                                                                                                  

Vital Signs:                                                                                      

10:52  / 73; Pulse 87; Resp 20; Temp 97.3; Pulse Ox 97% ;                               sv  

12:16  / 73; Pulse 76; Resp 18; Temp 98.0(TE); Pulse Ox 96% on R/A;                     mh5 

13:16  / 86; Pulse 88; Resp 18; Pulse Ox 100% ;                                         sv  

14:00  / 64; Pulse 75; Resp 16; Pulse Ox 95% ;                                          sv  

14:26 Weight 73.48 kg;                                                                        sg  

15:00  / 64; Pulse 77; Resp 18; Pulse Ox 95% ;                                          sv  

16:00  / 70; Pulse 76; Resp 17; Pulse Ox 96% ;                                          sv  

17:00  / 70; Pulse 77; Resp 17; Temp 97.6; Pulse Ox 96% on R/A;                         sg  

                                                                                                  

MDM:                                                                                              

10:46 Patient medically screened.                                                             tw4 

13:14 Data reviewed: vital signs, nurses notes. Data interpreted: Pulse oximetry:              

      Interpretation: normal. Counseling: I had a detailed discussion with the patient and/or     

      guardian regarding: the historical points, exam findings, and any diagnostic results        

      supporting the discharge/admit diagnosis. Physician consultation: Reji Rust MD was       

      contacted at 13:14, and will see patient in ED, after a discussion of the case, a           

      recommendation for transfer for higher level of care is made. Physician consultation:       

      and will see patient.                                                                       

                                                                                                  

10/15                                                                                             

11:24 Order name: Basic Metabolic Panel; Complete Time: 12:51                                 tw4 

10/15                                                                                             

12:52 Interpretation: Normal except: ; K 3.0; GLUC 134; BUN 2.                          tw4 

10/15                                                                                             

11:24 Order name: CBC with Diff; Complete Time: 12:51                                         tw4 

10/15                                                                                             

12:52 Interpretation: Normal except: RBC 3.74; HCT 33.3; MCHC 36.3.                           tw4 

10/15                                                                                             

11:24 Order name: Ckmb; Complete Time: 12:51                                                  tw4 

10/15                                                                                             

12:53 Interpretation: Within normal limits: CKMB 1.8.                                         tw4 

10/15                                                                                             

11:24 Order name: CPK; Complete Time: 12:51                                                   tw4 

10/15                                                                                             

12:53 Interpretation: Within normal limits: .                                          tw4 

10/15                                                                                             

11:24 Order name: Hepatic Function; Complete Time: 12:51                                      tw4 

10/15                                                                                             

12:52 Interpretation: Normal except: AST 12; ; TP 6.1; ALB 3.0; A/G 1.0.               tw4 

10/15                                                                                             

11:24 Order name: Lipase; Complete Time: 12:51                                                tw4 

10/15                                                                                             

12:52 Interpretation: Within normal limits: LIP 51.                                           tw4 

10/15                                                                                             

11:24 Order name: Magnesium; Complete Time: 12:51                                             tw4 

10/15                                                                                             

12:52 Interpretation: Within normal limits: MG 1.6.                                           tw4 

10/15                                                                                             

11:24 Order name: Protime (+inr); Complete Time: 12:51                                        4 

10/15                                                                                             

12:52 Interpretation: Within normal limits: PT 12.9.                                          tw4 

10/15                                                                                             

11:24 Order name: Ptt, Activated; Complete Time: 12:51                                         

10/15                                                                                             

12:53 Interpretation: Within normal limits: PTT 28.0.                                         tw4 

10/15                                                                                             

11:24 Order name: Troponin (emerg Dept Use Only); Complete Time: 12:51                         

10/15                                                                                             

12:53 Interpretation: Within normal limits: TROPED < 0.02.                                    tw4 

10/15                                                                                             

11:24 Order name: Lactate; Complete Time: 12:51                                                

10/15                                                                                             

12:52 Interpretation: Within normal limits: LAC 2.2.                                          tw4 

10/15                                                                                             

15:29 Order name: CKMB Creatine Kinase MB                                                     EDMS

10/15                                                                                             

15:29 Order name: CKMB Creatine Kinase MB                                                     EDMS

10/15                                                                                             

15:29 Order name: Creatine Phosphokinase                                                      [facility]

10/15                                                                                             

11:24 Order name: Call for Old EKG; Complete Time: 11:52                                       

10/15                                                                                             

11:24 Order name: CT Head Brain wo Cont; Complete Time: 12:51                                 tw4 

10/15                                                                                             

11:24 Order name: EKG; Complete Time: 11:26                                                    

10/15                                                                                             

11:24 Order name: Cardiac monitoring; Complete Time: 11:27                                    tw4 

10/15                                                                                             

15:29 Order name: NPO                                                                         EDMS

10/15                                                                                             

15:29 Order name: NPO                                                                         EDMS

10/15                                                                                             

15:29 Order name: NPO                                                                         EDMS

10/15                                                                                             

15:29 Order name: Creatine Phosphokinase                                                      EDMS

10/15                                                                                             

15:29 Order name: Lipid Profile                                                               EDMS

10/15                                                                                             

15:29 Order name: Lipid Profile                                                               EDMS

10/15                                                                                             

15:29 Order name: Troponin I                                                                  EDMS

10/15                                                                                             

15:29 Order name: Troponin I                                                                  EDMS

10/15                                                                                             

15:29 Order name: EEG Request                                                                 [facility]

10/15                                                                                             

17:02 Order name: Lactate Sepsis 2 HR Follow-up                                               EDMS

10/15                                                                                             

11:24 Order name: EKG - Nurse/Tech; Complete Time: 11:                                      tw4 

10/15                                                                                             

11:24 Order name: IV Saline Lock; Complete Time: :                                        tw4 

10/15                                                                                             

11:24 Order name: Labs collected and sent; Complete Time:                                tw4 

10/15                                                                                             

11:24 Order name: NPO; Complete Time:                                                    tw4 

10/15                                                                                             

11:24 Order name: O2 Per Protocol; Complete Time:                                        tw4 

10/15                                                                                             

11:24 Order name: O2 Sat Monitoring; Complete Time: :                                     tw4 

                                                                                                  

EC: Rate is 92 beats/min. Rhythm is regular. QRS Axis is Normal. SD interval is normal. QRS tw4 

      interval is normal. No Q waves. T waves are Flattened in leads III, aVL. No ST changes      

      noted. Clinical impression: Abnormal EKG without significant change. Interpreted by me.     

      Reviewed by me.                                                                             

                                                                                                  

Administered Medications:                                                                         

11:47 Drug: NS 0.9% 1000 ml Route: IV; Rate: 1 bolus; Site: left antecubital;                 sg  

13:10 Follow up: Response: No adverse reaction; IV Status: Completed infusion; IV Intake:     sg  

      100ml                                                                                       

13:50 Drug: Keppra 1000 mg Route: IV; Rate: calculated rate; Site: left antecubital;          sg  

15:00 Follow up: Response: No adverse reaction; IV Status: Completed infusion                 sg  

                                                                                                  

                                                                                                  

Disposition:                                                                                      

10/15/19 13:17 Hospitalization ordered by Guy Cheng for Inpatient Admission. Preliminary       

  diagnosis are NEW ONSET SEIZURES, Syncope and collapse, Hypokalemia.                            

- Bed requested for Telemetry/MedSurg (Inpatient).                                                

- Status is Inpatient Admission.                                                              sg  

- Condition is Stable.                                                                            

- Problem is new.                                                                                 

- Symptoms have improved.                                                                         

UTI on Admission? No                                                                              

                                                                                                  

                                                                                                  

                                                                                                  

Signatures:                                                                                       

Dispatcher [employer]                           EDMS                                                 

Rosalio Mercado RN RN                                                      

Chanda Salinas RN RN                                                      

Dominic Alicia MD MD   tw4                                                  

                                                                                                  

Corrections: (The following items were deleted from the chart)                                    

13:33 13:17 Hospitalization Ordered by Reji Rust MD for Inpatient Admission. Preliminary   tw4 

      diagnosis is NEW ONSET SEIZURES; Syncope and collapse; Hypokalemia. Bed requested for       

      Telemetry/MedSurg (Inpatient). Status is Inpatient Admission. Condition is Stable.          

      Problem is new. Symptoms have improved. UTI on Admission? No. tw4                           

16:19 13:33 10/15/2019 13:17 Hospitalization Ordered by Guy Cheng MD for Inpatient          hb  

      Admission. Preliminary diagnosis is NEW ONSET SEIZURES; Syncope and collapse;               

      Hypokalemia. Bed requested for Telemetry/MedSurg (Inpatient). Status is Inpatient           

      Admission. Condition is Stable. Problem is new. Symptoms have improved. UTI on              

      Admission? No. tw4                                                                          

17:34 16:19 10/15/2019 13:17 Hospitalization Ordered by Guy Cheng MD for Inpatient          sg  

      Admission. Preliminary diagnosis is NEW ONSET SEIZURES; Syncope and collapse;               

      Hypokalemia. Bed requested for Telemetry/MedSurg (Inpatient). Status is Inpatient           

      Admission. Condition is Stable. Problem is new. Symptoms have improved. UTI on              

      Admission? No. hb                                                                           

                                                                                                  

**************************************************************************************************

## 2019-10-15 NOTE — EKG
Test Date:    2019-10-15               Test Time:    10:46:44

Technician:   HARJIT                                     

                                                     

MEASUREMENT RESULTS:                                       

Intervals:                                           

Rate:         92                                     

OH:           142                                    

QRSD:         90                                     

QT:           332                                    

QTc:          410                                    

Axis:                                                

P:            67                                     

OH:           142                                    

QRS:          60                                     

T:            40                                     

                                                     

INTERPRETIVE STATEMENTS:                                       

                                                     

Normal sinus rhythm

Cannot rule out Inferior infarct, age undetermined

Abnormal ECG

Compared to ECG 08/15/2019 23:58:08

Myocardial infarct finding now present



Electronically Signed On 10-15-19 12:10:02 CDT by Boyd Urrutia

## 2019-10-15 NOTE — RAD REPORT
EXAM DESCRIPTION:  CT - Head Brain Wo Cont - 10/15/2019 11:41 am

 

CLINICAL HISTORY:  SYNCOPE

Seizure activity

 

COMPARISON:  Head Brain Wo Cont dated 8/15/2019; Head Brain Wo Cont dated 6/17/2016

 

TECHNIQUE:  All CT scans are performed using dose optimization technique as appropriate and may inclu
de automated exposure control or mA/KV adjustment according to patient size.

 

FINDINGS:  No intracranial hemorrhage, hydrocephalus or extra-axial fluid collection.No areas of brai
n edema or evidence of midline shift.

 

The paranasal sinuses and mastoids are clear. The calvarium is intact. Small posterior right scalp he
matoma.

 

IMPRESSION:  No acute intracranial abnormality.

## 2019-10-16 LAB
CKMB CREATINE KINASE MB: < 1 NG/ML (ref 0.3–3.6)
HDLC SERPL-MCNC: 53 MG/DL (ref 40–60)
LDLC SERPL CALC-MCNC: 41 MG/DL (ref ?–130)
TROPONIN I: < 0.02 NG/ML (ref 0–0.04)

## 2019-10-16 RX ADMIN — LEVETIRACETAM SCH MLS: 100 INJECTION, SOLUTION, CONCENTRATE INTRAVENOUS at 20:57

## 2019-10-16 RX ADMIN — Medication SCH: at 20:57

## 2019-10-16 RX ADMIN — BENZTROPINE MESYLATE SCH: 1 TABLET ORAL at 09:00

## 2019-10-16 RX ADMIN — DIVALPROEX SODIUM SCH MG: 500 TABLET, DELAYED RELEASE ORAL at 09:02

## 2019-10-16 RX ADMIN — DIVALPROEX SODIUM SCH MG: 500 TABLET, DELAYED RELEASE ORAL at 20:57

## 2019-10-16 RX ADMIN — SODIUM CHLORIDE SCH: 0.9 INJECTION, SOLUTION INTRAVENOUS at 13:00

## 2019-10-16 RX ADMIN — BENZTROPINE MESYLATE SCH MG: 1 TABLET ORAL at 20:56

## 2019-10-16 RX ADMIN — AMITRIPTYLINE HYDROCHLORIDE SCH MG: 50 TABLET, FILM COATED ORAL at 20:56

## 2019-10-16 RX ADMIN — RISPERIDONE SCH MG: 1 TABLET, FILM COATED ORAL at 20:56

## 2019-10-16 RX ADMIN — Medication SCH: at 14:00

## 2019-10-16 RX ADMIN — Medication SCH: at 09:00

## 2019-10-16 RX ADMIN — SODIUM CHLORIDE SCH MLS: 0.9 INJECTION, SOLUTION INTRAVENOUS at 05:54

## 2019-10-16 RX ADMIN — RISPERIDONE SCH MG: 1 TABLET, FILM COATED ORAL at 09:01

## 2019-10-16 RX ADMIN — SODIUM CHLORIDE SCH MLS: 0.9 INJECTION, SOLUTION INTRAVENOUS at 22:06

## 2019-10-16 NOTE — HP
Date of Admission:  10/15/2019



Chief Complaint:  Seizure.



History Of Present Illness:  This 59-year-old female was brought to the emergency room with history s
uggestive of seizure.  Patient had workup done.  There was no other clinical diagnosis.  Based on thi
s, the patient is admitted for observation.



Past Medical History:  Positive for COPD, severe schizophrenia.



Family History:  Unavailable.



Allergies:  CODEINE AND ERYTHROMYCIN AS PER RECORDS.



Home Medications:  Please refer to the chart.



Review of Systems:

Patient currently does not complain of any chest pain.



Physical Examination:

General:  Revealed a 59-year-old female, slightly lethargic, however, she is able to converse. 

HEENT:  No evidence of head injury. 

Neck:  Supple.  JVD negative. 

Chest:  Scattered wheezes. 

Heart:  Regular. 

Abdomen:  Soft, nontender. 

Extremities:  No edema.



Laboratory Data:  CAT scan, no evidence of acute changes.  White count normal.  Chem profile, potassi
um 3.0, otherwise negative.



Assessment:  

1.New-onset seizures.

2.Severe schizophrenia.



Plan:  Patient received Keppra.  Pending Neurology consultation and recommendations.





BARB/KARIN

DD:  10/16/2019 12:28:23Voice ID:  [id number]

## 2019-10-17 RX ADMIN — SODIUM CHLORIDE SCH MLS: 0.9 INJECTION, SOLUTION INTRAVENOUS at 09:05

## 2019-10-17 RX ADMIN — Medication SCH: at 09:00

## 2019-10-17 RX ADMIN — AMITRIPTYLINE HYDROCHLORIDE SCH MG: 50 TABLET, FILM COATED ORAL at 09:03

## 2019-10-17 RX ADMIN — RISPERIDONE SCH MG: 1 TABLET, FILM COATED ORAL at 21:12

## 2019-10-17 RX ADMIN — AMITRIPTYLINE HYDROCHLORIDE SCH MG: 50 TABLET, FILM COATED ORAL at 21:11

## 2019-10-17 RX ADMIN — DIVALPROEX SODIUM SCH MG: 500 TABLET, DELAYED RELEASE ORAL at 09:05

## 2019-10-17 RX ADMIN — Medication SCH: at 21:00

## 2019-10-17 RX ADMIN — BENZTROPINE MESYLATE SCH MG: 1 TABLET ORAL at 21:11

## 2019-10-17 RX ADMIN — BENZTROPINE MESYLATE SCH MG: 1 TABLET ORAL at 09:03

## 2019-10-17 RX ADMIN — Medication SCH: at 14:00

## 2019-10-17 RX ADMIN — LEVETIRACETAM SCH MLS: 100 INJECTION, SOLUTION, CONCENTRATE INTRAVENOUS at 21:12

## 2019-10-17 RX ADMIN — DIVALPROEX SODIUM SCH MG: 500 TABLET, DELAYED RELEASE ORAL at 21:12

## 2019-10-17 RX ADMIN — LEVETIRACETAM SCH MLS: 100 INJECTION, SOLUTION, CONCENTRATE INTRAVENOUS at 10:54

## 2019-10-17 RX ADMIN — SODIUM CHLORIDE SCH: 0.9 INJECTION, SOLUTION INTRAVENOUS at 19:00

## 2019-10-17 RX ADMIN — RISPERIDONE SCH MG: 1 TABLET, FILM COATED ORAL at 09:02

## 2019-10-17 NOTE — EEG
CHART:  [id number]

TEST ID#:  10/16/2019

DATE OF STUDY:  2019-0229



THE EEG WAS RECORDED PORTABLE IN THE PATIENTS ROOM ON A 17 CHANNEL MACHINE.  ELECTRODES 
WERE APPLIED IN THE USUAL MANNER USING THE INTERNATIONAL 10-20 SYSTEM.



THE WAKING BACKGROUND RHYTHM IN THIS RECORD CONSISTS OF FAIRLY WELL DEVELOPED AND FAIRLY 
WELL ORGANIZED WAVES OF 8.5-9 HZ., MAXIMAL IN THE POSTERIOR HEAD REGIONS WHICH ATTENUATE 
NORMALLY WITH EYE OPENING.  LOW-VOLTAGE 18-22 HZ ACTIVITY IS EXPRESSED IN THE FRONTAL 
REGIONS.



THERE ARE NO FOCAL OR LATERALIZING FEATURES.  NO EPILEPTIFORM ACTIVITY APPEARS.  

SLEEP DID NOT OCCUR.  



HYPERVENTILATION WAS NOT PERFORMED.



PHOTIC STIMULATION PRODUCED POOR DRIVING BILATERALLY.



IMPRESSION:  NORMAL EEG FOR THE AGE OF THE PATIENT IN [place].

## 2019-10-17 NOTE — CON
Reason For Consultation:  Consultation called because of new onset seizure.



History Of Present Illness:  Ms. Peña is a 59-year-old  patient who has history of bipolar
 disorder, schizophrenia, and who reportedly had a new onset seizure.  The patient currently is aslee
p, being sedated after receiving a loading dose of Keppra and is on Keppra 500 mg twice daily.  From 
chart review, the seizure involved generalized shaking with a blank stare.  Unclear how long the even
t occurred, but it was witnessed by family.  There was no prior history of seizures and the patient d
id not suffer any injury during the event such as head trauma or any other area.  The lead-up to the 
seizure is not clear if the patient had any precipitating factors.  None is noted.



Past Medical History:  As I have indicated.



Past Surgical History:  Unknown at this point.



Social History:  Not clear at this point.



Medications:  Amitriptyline 100 mg twice daily, benztropine 0.05 mg twice daily, Depakote extended re
lease twice daily, doxepin 25 mg 3 times daily, paroxetine 20 mg daily, risperidone 3 mg twice daily.




Family History:  Not clear at this point.



Allergies:  CODEINE AND ERYTHROMYCIN.



Review of Systems:

Not obtainable at this point.  Patient is sedated and in sleep following Keppra.



Physical Examination:

Vital Signs:  Blood pressure 130/73, pulse 81, respiratory rate 16, temperature 98, oxygen saturation
 94% on room air, weight 160 pounds, height 5 feet 6 inches. 

General:  Ms. Peña is in sleep in bed. 

HEENT:  She does not have obvious asymmetry of the face. 

Extremities:  Tone is equal in the upper and lower extremities.  Unable to fully complete her exam as
 again she received a loading dose of Keppra.



Laboratory Studies:  Complete blood count with differential essentially unremarkable.  White blood ce
ll count 9.8, hemoglobin 12.1, hematocrit 33.3, platelets are 301.  Coagulation panel show INR 1.1.  
Chemistries, slightly low sodium at 132, potassium is low at 3.0.  Lactic acid was elevated at 2.2.  
Alkaline phosphatase elevated at 186.  Creatine kinase on admission was normal at 165.  Lipid panel i
s unremarkable. 



Head CT scan shows no acute ischemic or hemorrhagic change.  No areas of cerebral edema or midline sh
ift.  Her electrocardiogram shows normal sinus rhythm, cannot rule out inferior infarct, age undeterm
ined.



Assessment:  Ms. Peña is a 59-year-old patient with psychiatric history including bipolar disorder 
and schizophrenia, who comes in with new onset seizure.  She is on multiple antipsychotic medications
.  She has hyponatremia and hypokalemia.  No evidence of a systemic infection.



Plan:  

1.We will obtain routine electroencephalogram.

2.Brain MRI without and with contrast, epilepsy protocol.

3.Continue Keppra 500 mg twice daily.

4.Once patient is more awake, we will complete full neurological evaluation.

5.She may require event characterization depending on the findings on the routine EEG.

6.She will probably need to return back to the area with supervision depending on how she does once 
awake.





LOUIS/KARIN

DD:  10/16/2019 20:40:47Voice ID:  [id number]

DT:  10/17/2019 01:29:00Report ID:  [id number]

## 2019-10-18 RX ADMIN — SODIUM CHLORIDE SCH MLS: 0.9 INJECTION, SOLUTION INTRAVENOUS at 05:00

## 2019-10-18 RX ADMIN — Medication SCH: at 09:00

## 2019-10-18 RX ADMIN — RISPERIDONE SCH MG: 1 TABLET, FILM COATED ORAL at 22:58

## 2019-10-18 RX ADMIN — Medication SCH: at 21:00

## 2019-10-18 RX ADMIN — BENZTROPINE MESYLATE SCH MG: 1 TABLET ORAL at 08:04

## 2019-10-18 RX ADMIN — RISPERIDONE SCH MG: 1 TABLET, FILM COATED ORAL at 13:47

## 2019-10-18 RX ADMIN — SODIUM CHLORIDE SCH: 0.9 INJECTION, SOLUTION INTRAVENOUS at 15:00

## 2019-10-18 RX ADMIN — RISPERIDONE SCH MG: 1 TABLET, FILM COATED ORAL at 08:04

## 2019-10-18 RX ADMIN — Medication SCH: at 14:00

## 2019-10-18 RX ADMIN — DIVALPROEX SODIUM SCH MG: 500 TABLET, DELAYED RELEASE ORAL at 08:03

## 2019-10-18 RX ADMIN — DIVALPROEX SODIUM SCH MG: 500 TABLET, DELAYED RELEASE ORAL at 22:58

## 2019-10-18 RX ADMIN — AMITRIPTYLINE HYDROCHLORIDE SCH MG: 50 TABLET, FILM COATED ORAL at 08:03

## 2019-10-18 RX ADMIN — AMITRIPTYLINE HYDROCHLORIDE SCH MG: 50 TABLET, FILM COATED ORAL at 22:58

## 2019-10-18 RX ADMIN — BENZTROPINE MESYLATE SCH MG: 1 TABLET ORAL at 22:58

## 2019-10-18 NOTE — PN
Subjective:  Patient is afebrile.  She is still lethargic, however, she is psychotic with constant sc
reaming.  I increased the dose of Risperdal.  I spoke to her , who also is of the opinion that 
patient needs probably hospitalized for this issue and may even need institutional care as she has no
 backup of social connections.  The patient in meanwhile has no seizures.  According to the , s
he stop taking her medications.  It is possible that she has seizures because of abrupt withdrawal of
 medicines.





BARB/KARIN

DD:  10/17/2019 19:05:51Voice ID:  [id number]

DT:  10/18/2019 00:03:47Report ID:  [id number]

## 2019-10-18 NOTE — PN
Patient's mentation is better; however, she is not able to comprehend when I spoke to her.  [facility]
le have visited her and their recommendation is for inpatient care with which I agree.  The process i
n progress for transfer.





BARB/KARIN

DD:  10/18/2019 16:52:27Voice ID:  [id number]

DT:  10/18/2019 22:12:03Report ID:  [id number]

## 2019-10-19 RX ADMIN — Medication SCH: at 08:54

## 2019-10-19 RX ADMIN — BENZTROPINE MESYLATE SCH MG: 1 TABLET ORAL at 21:26

## 2019-10-19 RX ADMIN — AMITRIPTYLINE HYDROCHLORIDE SCH MG: 50 TABLET, FILM COATED ORAL at 21:27

## 2019-10-19 RX ADMIN — AMITRIPTYLINE HYDROCHLORIDE SCH MG: 50 TABLET, FILM COATED ORAL at 08:52

## 2019-10-19 RX ADMIN — Medication SCH: at 14:00

## 2019-10-19 RX ADMIN — RISPERIDONE SCH MG: 1 TABLET, FILM COATED ORAL at 14:13

## 2019-10-19 RX ADMIN — DIVALPROEX SODIUM SCH MG: 500 TABLET, DELAYED RELEASE ORAL at 21:27

## 2019-10-19 RX ADMIN — RISPERIDONE SCH MG: 1 TABLET, FILM COATED ORAL at 21:27

## 2019-10-19 RX ADMIN — Medication SCH: at 21:00

## 2019-10-19 RX ADMIN — BENZTROPINE MESYLATE SCH MG: 1 TABLET ORAL at 08:52

## 2019-10-19 RX ADMIN — RISPERIDONE SCH MG: 1 TABLET, FILM COATED ORAL at 08:54

## 2019-10-19 RX ADMIN — DIVALPROEX SODIUM SCH MG: 500 TABLET, DELAYED RELEASE ORAL at 08:52

## 2019-10-19 NOTE — PN
Patient had no further seizures.  He is afebrile.  In view of her mental status, still the plan is to
 send her to a psych facility for inpatient care.  Patient generally is eating well.  Her IV is disco
ntinued.





BARB/KARIN

DD:  10/19/2019 15:40:25Voice ID:  [id number]

DT:  10/19/2019 16:05:17Report ID:  [id number]

## 2019-10-20 LAB
ALBUMIN SERPL BCP-MCNC: 2.7 G/DL (ref 3.4–5)
ALP SERPL-CCNC: 199 U/L (ref 45–117)
ALT SERPL W P-5'-P-CCNC: 19 U/L (ref 12–78)
AST SERPL W P-5'-P-CCNC: 16 U/L (ref 15–37)
BUN BLD-MCNC: 17 MG/DL (ref 7–18)
BUN BLD-MCNC: 9 MG/DL (ref 7–18)
GLUCOSE SERPLBLD-MCNC: 124 MG/DL (ref 74–106)
GLUCOSE SERPLBLD-MCNC: 152 MG/DL (ref 74–106)
HCT VFR BLD CALC: 37.8 % (ref 36–45)
LYMPHOCYTES # SPEC AUTO: 0.2 K/UL (ref 0.7–4.9)
MORPHOLOGY BLD-IMP: (no result)
PMV BLD: 8.9 FL (ref 7.6–11.3)
POTASSIUM SERPL-SCNC: 3.5 MMOL/L (ref 3.5–5.1)
POTASSIUM SERPL-SCNC: 3.7 MMOL/L (ref 3.5–5.1)
RBC # BLD: 4.26 M/UL (ref 3.86–4.86)
UA COMPLETE W REFLEX CULTURE PNL UR: (no result)

## 2019-10-20 RX ADMIN — RISPERIDONE SCH: 1 TABLET, FILM COATED ORAL at 14:00

## 2019-10-20 RX ADMIN — SODIUM CHLORIDE SCH MLS: 0.9 INJECTION, SOLUTION INTRAVENOUS at 22:11

## 2019-10-20 RX ADMIN — RISPERIDONE SCH MG: 1 TABLET, FILM COATED ORAL at 22:15

## 2019-10-20 RX ADMIN — DIVALPROEX SODIUM SCH MG: 500 TABLET, DELAYED RELEASE ORAL at 22:13

## 2019-10-20 RX ADMIN — RISPERIDONE SCH MG: 1 TABLET, FILM COATED ORAL at 10:36

## 2019-10-20 RX ADMIN — ENOXAPARIN SODIUM SCH MG: 30 INJECTION SUBCUTANEOUS at 10:38

## 2019-10-20 RX ADMIN — DIVALPROEX SODIUM SCH MG: 500 TABLET, DELAYED RELEASE ORAL at 10:35

## 2019-10-20 RX ADMIN — AMITRIPTYLINE HYDROCHLORIDE SCH MG: 50 TABLET, FILM COATED ORAL at 21:00

## 2019-10-20 RX ADMIN — BENZTROPINE MESYLATE SCH MG: 1 TABLET ORAL at 22:13

## 2019-10-20 RX ADMIN — ACETAMINOPHEN PRN MG: 325 TABLET ORAL at 12:25

## 2019-10-20 RX ADMIN — BENZTROPINE MESYLATE SCH MG: 1 TABLET ORAL at 10:36

## 2019-10-20 RX ADMIN — AMITRIPTYLINE HYDROCHLORIDE SCH MG: 50 TABLET, FILM COATED ORAL at 10:35

## 2019-10-20 RX ADMIN — Medication SCH: at 14:00

## 2019-10-20 RX ADMIN — Medication SCH: at 21:00

## 2019-10-20 RX ADMIN — Medication SCH: at 09:00

## 2019-10-20 RX ADMIN — CEFTRIAXONE SCH MLS: 1 INJECTION, SOLUTION INTRAVENOUS at 06:00

## 2019-10-20 NOTE — RAD REPORT
EXAM DESCRIPTION:  RAD - Humerus Left - 10/20/2019 10:44 am

 

CLINICAL HISTORY:   Left arm pain

 

FINDINGS:   No fracture is seen. Osteoporosis

 

Refer to the left shoulder report for shoulder findings

## 2019-10-20 NOTE — RAD REPORT
EXAM DESCRIPTION:  RAD - Shoulder  Left 2 View - 10/20/2019 10:44 am

 

CLINICAL HISTORY:  Left shoulder pain

 

FINDINGS:

 Lucencies with sclerotic borders are present within the humeral head. Vague areas of sclerosis are a
lso seen. Glenohumeral joint space narrowing is noted.

 

11 millimeter bony/calcific density lies adjacent to the inferior aspect of the glenohumeral joint.

 

Osteoporosis

 

No acute fracture or dislocation seen

 

 

 

IMPRESSION:  Lucencies within the left humeral head with vague areas of sclerosis may be related to o
steoarthritis. Avascular necrosis can also have this appearance

 

11 millimeter bony/calcific density along the inferior aspect of the glenohumeral joint May represent
 a chronic degenerative calcification or be secondary to old trauma. An acute process is considered l
ess likely.

 

If pain is significant further evaluation with MRI may be helpful

## 2019-10-20 NOTE — RAD REPORT
EXAM DESCRIPTION:  RAD - Pelvis - 10/20/2019 10:44 am

 

CLINICAL HISTORY:  Pelvic pain status post injury

 

FINDINGS:  No fracture or dislocation is seen. Bones appear somewhat osteoporotic

 

If the patient continues to have symptoms to suggest an occult fracture then MRI would be recommended

## 2019-10-20 NOTE — RAD REPORT
EXAM DESCRIPTION:  RAD - Hip Left 2 View - 10/20/2019 10:44 am

 

CLINICAL HISTORY:   Left hip pain status post injury

 

FINDINGS:

No fracture or dislocation is seen. Bones appear somewhat osteoporotic

 

If the patient continues to have symptoms to suggest an occult fracture then MRI would be recommended

## 2019-10-20 NOTE — RAD REPORT
EXAM DESCRIPTION:  Lance Single View10/20/2019 10:44 am

 

CLINICAL HISTORY:  Chest pain

 

COMPARISON:  2016

 

FINDINGS:   The lungs appear clear of acute infiltrate. The heart is mildly enlarged

 

IMPRESSION:   No acute abnormalities displayed

## 2019-10-21 RX ADMIN — DIVALPROEX SODIUM SCH MG: 500 TABLET, DELAYED RELEASE ORAL at 08:30

## 2019-10-21 RX ADMIN — SODIUM CHLORIDE SCH: 0.9 INJECTION, SOLUTION INTRAVENOUS at 03:40

## 2019-10-21 RX ADMIN — Medication SCH: at 21:00

## 2019-10-21 RX ADMIN — AMITRIPTYLINE HYDROCHLORIDE SCH MG: 50 TABLET, FILM COATED ORAL at 08:30

## 2019-10-21 RX ADMIN — RISPERIDONE SCH MG: 1 TABLET, FILM COATED ORAL at 21:48

## 2019-10-21 RX ADMIN — ENOXAPARIN SODIUM SCH: 30 INJECTION SUBCUTANEOUS at 09:00

## 2019-10-21 RX ADMIN — SODIUM CHLORIDE SCH MLS: 0.9 INJECTION, SOLUTION INTRAVENOUS at 15:57

## 2019-10-21 RX ADMIN — SODIUM CHLORIDE SCH MLS: 0.9 INJECTION, SOLUTION INTRAVENOUS at 22:56

## 2019-10-21 RX ADMIN — SODIUM CHLORIDE SCH MLS: 0.9 INJECTION, SOLUTION INTRAVENOUS at 10:00

## 2019-10-21 RX ADMIN — BENZTROPINE MESYLATE SCH MG: 1 TABLET ORAL at 08:29

## 2019-10-21 RX ADMIN — ACETAMINOPHEN PRN MG: 325 TABLET ORAL at 01:53

## 2019-10-21 RX ADMIN — ENOXAPARIN SODIUM SCH MG: 30 INJECTION SUBCUTANEOUS at 08:31

## 2019-10-21 RX ADMIN — ACETAMINOPHEN PRN MG: 325 TABLET ORAL at 08:30

## 2019-10-21 RX ADMIN — Medication SCH: at 08:31

## 2019-10-21 RX ADMIN — Medication SCH: at 14:00

## 2019-10-21 RX ADMIN — BENZTROPINE MESYLATE SCH MG: 1 TABLET ORAL at 21:47

## 2019-10-21 RX ADMIN — CEFTRIAXONE SCH MLS: 1 INJECTION, SOLUTION INTRAVENOUS at 09:52

## 2019-10-21 RX ADMIN — AMITRIPTYLINE HYDROCHLORIDE SCH MG: 50 TABLET, FILM COATED ORAL at 21:48

## 2019-10-21 RX ADMIN — RISPERIDONE SCH MG: 1 TABLET, FILM COATED ORAL at 08:29

## 2019-10-21 RX ADMIN — DIVALPROEX SODIUM SCH MG: 500 TABLET, DELAYED RELEASE ORAL at 21:48

## 2019-10-21 NOTE — RAD REPORT
EXAM DESCRIPTION:  CT - Abdomen   Pelvis W Contrast - 10/21/2019 5:34 am

 

CLINICAL HISTORY:  Fever source?

 

COMPARISON:  None.

 

TECHNIQUE:  CT ABDOMEN PELVIS WITH IV CONTRAST on 10/20/2019 12:00 AM CDT

This exam was performed according to our departmental dose-optimization program, which includes autom
ated exposure control, adjustment of the mA and/or kV according to patient size and/or use of iterati
ve reconstruction technique.

 

FINDINGS:  There are trace pleural effusions. There is bibasilar atelectasis.

Abdomen: Liver is fatty in attenuation. There is no biliary dilatation. Cholecystectomy was performed
. There is a small hiatal hernia. The pancreas is diffusely atrophic and partially calcified. Spleen 
is normal in size. The adrenal glands and kidneys are unremarkable.

Abdominal aorta is normal in course and caliber without aneurysm. There is no free air. There is no r
etroperitoneal adenopathy.There is a supraumbilical fat-containing abdominal wall hernia.

Pelvis: There is no bowel obstruction. Urinary bladder contains a Raymond catheter. There is no free fl
uid. Uterus is not well-seen. Appendix is not well seen.

Skeleton: There are no acute osseous findings. No suspicious bony lesions.

 

IMPRESSION:  No definite acute inflammatory process.

 

Electronically signed by:   Neo Leroy MD   10/21/2019 1:35 AM CDT Workstation: [id number]

 

 

 

Due to temporary technical issues with the PACS/Fluency reporting system, reports are being signed by
 the in house radiologist as a courtesy to ensure prompt reporting. The interpreting radiologist is f
ully responsible for the content of the report.

## 2019-10-22 LAB
HCT VFR BLD CALC: 31 % (ref 36–45)
LYMPHOCYTES # SPEC AUTO: 0.6 K/UL (ref 0.7–4.9)
PMV BLD: 9.7 FL (ref 7.6–11.3)
RBC # BLD: 3.47 M/UL (ref 3.86–4.86)

## 2019-10-22 RX ADMIN — Medication SCH: at 14:00

## 2019-10-22 RX ADMIN — DIVALPROEX SODIUM SCH MG: 500 TABLET, DELAYED RELEASE ORAL at 09:26

## 2019-10-22 RX ADMIN — SODIUM CHLORIDE SCH: 0.9 INJECTION, SOLUTION INTRAVENOUS at 20:15

## 2019-10-22 RX ADMIN — ENOXAPARIN SODIUM SCH MG: 30 INJECTION SUBCUTANEOUS at 09:25

## 2019-10-22 RX ADMIN — AMITRIPTYLINE HYDROCHLORIDE SCH MG: 50 TABLET, FILM COATED ORAL at 20:06

## 2019-10-22 RX ADMIN — Medication SCH: at 09:00

## 2019-10-22 RX ADMIN — SODIUM CHLORIDE SCH MLS: 0.9 INJECTION, SOLUTION INTRAVENOUS at 00:58

## 2019-10-22 RX ADMIN — MUPIROCIN SCH APPL: 20 OINTMENT TOPICAL at 09:25

## 2019-10-22 RX ADMIN — DIVALPROEX SODIUM SCH MG: 500 TABLET, DELAYED RELEASE ORAL at 20:03

## 2019-10-22 RX ADMIN — RISPERIDONE SCH MG: 1 TABLET, FILM COATED ORAL at 20:03

## 2019-10-22 RX ADMIN — BENZTROPINE MESYLATE SCH MG: 1 TABLET ORAL at 20:02

## 2019-10-22 RX ADMIN — AMITRIPTYLINE HYDROCHLORIDE SCH MG: 50 TABLET, FILM COATED ORAL at 09:00

## 2019-10-22 RX ADMIN — CEFTRIAXONE SCH MLS: 1 INJECTION, SOLUTION INTRAVENOUS at 09:25

## 2019-10-22 RX ADMIN — Medication SCH: at 20:07

## 2019-10-22 RX ADMIN — SODIUM CHLORIDE SCH MLS: 0.9 INJECTION, SOLUTION INTRAVENOUS at 16:28

## 2019-10-22 RX ADMIN — RISPERIDONE SCH MG: 1 TABLET, FILM COATED ORAL at 09:26

## 2019-10-22 RX ADMIN — BENZTROPINE MESYLATE SCH MG: 1 TABLET ORAL at 09:26

## 2019-10-22 NOTE — PN
Patient is more alert.  Her vitals are normal.  She has normal blood pressure.  Still her intake is p
oor.  Her IV fluids will be decreased to 50 cc an hour.  Her urine culture did not show significant c
olony count.  Since her CAT scan of the abdomen, chest x-ray is negative, she will be continued on Ro
cephin even though the source of infection is not clear.





BARB/KARIN

DD:  10/21/2019 20:25:42Voice ID:  [id number]

DT:  10/22/2019 01:25:45Report ID:  [id number]

## 2019-10-22 NOTE — PN
The patient is generally stable.  Her temperature is down.  Her CAT scan, urine cultures, they are al
l negative.  The assumption is that she has wound infection causing.  She will be continued on Roceph
in.  She eventually would need transfer to mental facility for inpatient care.





BARB/KARIN

DD:  10/22/2019 12:44:13Voice ID:  [id number]

DT:  10/22/2019 16:57:18Report ID:  [id number]

## 2019-10-23 RX ADMIN — DIVALPROEX SODIUM SCH MG: 500 TABLET, DELAYED RELEASE ORAL at 09:28

## 2019-10-23 RX ADMIN — Medication SCH: at 13:38

## 2019-10-23 RX ADMIN — AMITRIPTYLINE HYDROCHLORIDE SCH MG: 50 TABLET, FILM COATED ORAL at 21:00

## 2019-10-23 RX ADMIN — BENZTROPINE MESYLATE SCH MG: 1 TABLET ORAL at 09:27

## 2019-10-23 RX ADMIN — SODIUM CHLORIDE SCH MLS: 0.9 INJECTION, SOLUTION INTRAVENOUS at 10:37

## 2019-10-23 RX ADMIN — CEFTRIAXONE SCH MLS: 1 INJECTION, SOLUTION INTRAVENOUS at 09:27

## 2019-10-23 RX ADMIN — ENOXAPARIN SODIUM SCH MG: 30 INJECTION SUBCUTANEOUS at 09:27

## 2019-10-23 RX ADMIN — AMITRIPTYLINE HYDROCHLORIDE SCH MG: 50 TABLET, FILM COATED ORAL at 09:27

## 2019-10-23 RX ADMIN — Medication SCH: at 21:00

## 2019-10-23 RX ADMIN — RISPERIDONE SCH MG: 1 TABLET, FILM COATED ORAL at 09:27

## 2019-10-23 RX ADMIN — BENZTROPINE MESYLATE SCH MG: 1 TABLET ORAL at 21:58

## 2019-10-23 RX ADMIN — MUPIROCIN SCH APPL: 20 OINTMENT TOPICAL at 09:49

## 2019-10-23 RX ADMIN — Medication SCH: at 09:00

## 2019-10-23 RX ADMIN — DIVALPROEX SODIUM SCH MG: 500 TABLET, DELAYED RELEASE ORAL at 21:59

## 2019-10-23 RX ADMIN — RISPERIDONE SCH MG: 1 TABLET, FILM COATED ORAL at 21:59

## 2019-10-24 RX ADMIN — AMITRIPTYLINE HYDROCHLORIDE SCH MG: 50 TABLET, FILM COATED ORAL at 08:50

## 2019-10-24 RX ADMIN — Medication SCH: at 13:39

## 2019-10-24 RX ADMIN — CEPHALEXIN SCH MG: 500 CAPSULE ORAL at 11:35

## 2019-10-24 RX ADMIN — RISPERIDONE SCH MG: 1 TABLET, FILM COATED ORAL at 08:51

## 2019-10-24 RX ADMIN — Medication SCH: at 20:39

## 2019-10-24 RX ADMIN — SODIUM CHLORIDE SCH: 0.9 INJECTION, SOLUTION INTRAVENOUS at 13:00

## 2019-10-24 RX ADMIN — BENZTROPINE MESYLATE SCH MG: 1 TABLET ORAL at 20:35

## 2019-10-24 RX ADMIN — DIVALPROEX SODIUM SCH MG: 500 TABLET, DELAYED RELEASE ORAL at 20:37

## 2019-10-24 RX ADMIN — CEPHALEXIN SCH MG: 500 CAPSULE ORAL at 23:23

## 2019-10-24 RX ADMIN — MUPIROCIN SCH APPL: 20 OINTMENT TOPICAL at 08:52

## 2019-10-24 RX ADMIN — ENOXAPARIN SODIUM SCH MG: 30 INJECTION SUBCUTANEOUS at 08:50

## 2019-10-24 RX ADMIN — CEPHALEXIN SCH MG: 500 CAPSULE ORAL at 17:09

## 2019-10-24 RX ADMIN — AMITRIPTYLINE HYDROCHLORIDE SCH MG: 50 TABLET, FILM COATED ORAL at 20:38

## 2019-10-24 RX ADMIN — Medication SCH: at 08:51

## 2019-10-24 RX ADMIN — RISPERIDONE SCH MG: 1 TABLET, FILM COATED ORAL at 20:39

## 2019-10-24 RX ADMIN — BENZTROPINE MESYLATE SCH MG: 1 TABLET ORAL at 08:50

## 2019-10-24 RX ADMIN — SODIUM CHLORIDE SCH MLS: 0.9 INJECTION, SOLUTION INTRAVENOUS at 08:49

## 2019-10-24 RX ADMIN — DIVALPROEX SODIUM SCH MG: 500 TABLET, DELAYED RELEASE ORAL at 08:50

## 2019-10-24 NOTE — PN
Patient is afebrile.  She is still confused.  Patient will be changed to oral antibiotic.  I spoke to
 psychiatric facility regarding transfer.  Patient would need either prolonged inpatient stay or even
 consideration for mental long-term institution may not be inappropriate.  This is based on my conver
sation with her Religion .  However, at this point, she needs psychiatric help.





BARB/KARIN

DD:  10/23/2019 18:25:41Voice ID:  [id number]

DT:  10/23/2019 23:33:48Report ID:  [id number]

## 2019-10-25 LAB — CKMB CREATINE KINASE MB: < 1 NG/ML (ref 0.3–3.6)

## 2019-10-25 RX ADMIN — DIVALPROEX SODIUM SCH MG: 500 TABLET, DELAYED RELEASE ORAL at 19:58

## 2019-10-25 RX ADMIN — RISPERIDONE SCH MG: 1 TABLET, FILM COATED ORAL at 09:11

## 2019-10-25 RX ADMIN — RISPERIDONE SCH MG: 1 TABLET, FILM COATED ORAL at 20:02

## 2019-10-25 RX ADMIN — BENZTROPINE MESYLATE SCH MG: 1 TABLET ORAL at 09:09

## 2019-10-25 RX ADMIN — BENZTROPINE MESYLATE SCH MG: 1 TABLET ORAL at 19:58

## 2019-10-25 RX ADMIN — ENOXAPARIN SODIUM SCH MG: 30 INJECTION SUBCUTANEOUS at 09:10

## 2019-10-25 RX ADMIN — Medication SCH: at 20:02

## 2019-10-25 RX ADMIN — SODIUM CHLORIDE SCH MLS: 0.9 INJECTION, SOLUTION INTRAVENOUS at 06:14

## 2019-10-25 RX ADMIN — CEPHALEXIN SCH MG: 500 CAPSULE ORAL at 06:14

## 2019-10-25 RX ADMIN — CEPHALEXIN SCH MG: 500 CAPSULE ORAL at 11:36

## 2019-10-25 RX ADMIN — Medication SCH: at 14:00

## 2019-10-25 RX ADMIN — DIVALPROEX SODIUM SCH MG: 500 TABLET, DELAYED RELEASE ORAL at 09:09

## 2019-10-25 RX ADMIN — CEPHALEXIN SCH MG: 500 CAPSULE ORAL at 17:36

## 2019-10-25 RX ADMIN — MUPIROCIN SCH APPL: 20 OINTMENT TOPICAL at 11:37

## 2019-10-25 RX ADMIN — Medication SCH: at 09:00

## 2019-10-25 RX ADMIN — AMITRIPTYLINE HYDROCHLORIDE SCH MG: 50 TABLET, FILM COATED ORAL at 20:01

## 2019-10-25 RX ADMIN — AMITRIPTYLINE HYDROCHLORIDE SCH MG: 50 TABLET, FILM COATED ORAL at 09:00

## 2019-10-25 NOTE — PN
Patient is on oral antibiotic.  She is afebrile.  I spoke to our  regarding her care and plan. 
 Patient is waiting for transfer to a psych facility.





BARB/KARIN

DD:  10/24/2019 22:09:08Voice ID:  [id number]

DT:  10/25/2019 02:31:55Report ID:  [id number]

## 2019-10-26 RX ADMIN — CEPHALEXIN SCH MG: 500 CAPSULE ORAL at 13:19

## 2019-10-26 RX ADMIN — DIVALPROEX SODIUM SCH MG: 500 TABLET, DELAYED RELEASE ORAL at 20:27

## 2019-10-26 RX ADMIN — MUPIROCIN SCH APPL: 20 OINTMENT TOPICAL at 13:44

## 2019-10-26 RX ADMIN — SODIUM CHLORIDE SCH MLS: 0.9 INJECTION, SOLUTION INTRAVENOUS at 23:47

## 2019-10-26 RX ADMIN — Medication SCH: at 13:44

## 2019-10-26 RX ADMIN — CEPHALEXIN SCH MG: 500 CAPSULE ORAL at 17:05

## 2019-10-26 RX ADMIN — CEPHALEXIN SCH MG: 500 CAPSULE ORAL at 05:50

## 2019-10-26 RX ADMIN — AMITRIPTYLINE HYDROCHLORIDE SCH MG: 50 TABLET, FILM COATED ORAL at 09:46

## 2019-10-26 RX ADMIN — DIVALPROEX SODIUM SCH MG: 500 TABLET, DELAYED RELEASE ORAL at 09:46

## 2019-10-26 RX ADMIN — BENZTROPINE MESYLATE SCH MG: 1 TABLET ORAL at 09:49

## 2019-10-26 RX ADMIN — RISPERIDONE SCH MG: 1 TABLET, FILM COATED ORAL at 20:28

## 2019-10-26 RX ADMIN — Medication SCH: at 09:00

## 2019-10-26 RX ADMIN — CEPHALEXIN SCH MG: 500 CAPSULE ORAL at 23:47

## 2019-10-26 RX ADMIN — BENZTROPINE MESYLATE SCH MG: 1 TABLET ORAL at 20:27

## 2019-10-26 RX ADMIN — CEPHALEXIN SCH MG: 500 CAPSULE ORAL at 00:11

## 2019-10-26 RX ADMIN — ENOXAPARIN SODIUM SCH MG: 30 INJECTION SUBCUTANEOUS at 09:46

## 2019-10-26 RX ADMIN — RISPERIDONE SCH MG: 1 TABLET, FILM COATED ORAL at 09:46

## 2019-10-26 RX ADMIN — Medication SCH: at 20:28

## 2019-10-26 RX ADMIN — AMITRIPTYLINE HYDROCHLORIDE SCH MG: 50 TABLET, FILM COATED ORAL at 20:27

## 2019-10-26 RX ADMIN — SODIUM CHLORIDE SCH MLS: 0.9 INJECTION, SOLUTION INTRAVENOUS at 05:52

## 2019-10-26 NOTE — PN
Patient is afebrile.  She is eating adequately.  She is still confused.  I spoke to the charge nurse 
regarding her transfer plan.  I was told that still the transfer plan is in progress.  Pending approv
dell.





BARB/KARIN

DD:  10/25/2019 18:52:46Voice ID:  [id number]

DT:  10/26/2019 00:39:45Report ID:  [id number]

## 2019-10-27 RX ADMIN — CEPHALEXIN SCH MG: 500 CAPSULE ORAL at 23:25

## 2019-10-27 RX ADMIN — SODIUM CHLORIDE SCH MLS: 0.9 INJECTION, SOLUTION INTRAVENOUS at 19:25

## 2019-10-27 RX ADMIN — ENOXAPARIN SODIUM SCH MG: 30 INJECTION SUBCUTANEOUS at 09:28

## 2019-10-27 RX ADMIN — BENZTROPINE MESYLATE SCH MG: 1 TABLET ORAL at 21:00

## 2019-10-27 RX ADMIN — RISPERIDONE SCH MG: 1 TABLET, FILM COATED ORAL at 09:30

## 2019-10-27 RX ADMIN — RISPERIDONE SCH MG: 1 TABLET, FILM COATED ORAL at 21:00

## 2019-10-27 RX ADMIN — CEPHALEXIN SCH MG: 500 CAPSULE ORAL at 12:40

## 2019-10-27 RX ADMIN — CEPHALEXIN SCH MG: 500 CAPSULE ORAL at 17:33

## 2019-10-27 RX ADMIN — MUPIROCIN SCH APPL: 20 OINTMENT TOPICAL at 09:29

## 2019-10-27 RX ADMIN — BENZTROPINE MESYLATE SCH MG: 1 TABLET ORAL at 09:29

## 2019-10-27 RX ADMIN — DIVALPROEX SODIUM SCH MG: 500 TABLET, DELAYED RELEASE ORAL at 09:30

## 2019-10-27 RX ADMIN — CEPHALEXIN SCH MG: 500 CAPSULE ORAL at 05:35

## 2019-10-27 RX ADMIN — DIVALPROEX SODIUM SCH MG: 500 TABLET, DELAYED RELEASE ORAL at 20:59

## 2019-10-27 RX ADMIN — Medication SCH: at 21:00

## 2019-10-27 RX ADMIN — SODIUM CHLORIDE SCH: 0.9 INJECTION, SOLUTION INTRAVENOUS at 21:00

## 2019-10-27 RX ADMIN — Medication SCH: at 13:47

## 2019-10-27 RX ADMIN — Medication SCH: at 09:00

## 2019-10-27 RX ADMIN — AMITRIPTYLINE HYDROCHLORIDE SCH MG: 50 TABLET, FILM COATED ORAL at 09:29

## 2019-10-27 RX ADMIN — AMITRIPTYLINE HYDROCHLORIDE SCH MG: 50 TABLET, FILM COATED ORAL at 21:00

## 2019-10-27 NOTE — PN
Patient generally is doing same with episodes of confusion.  However, she is afebrile.  Her wounds on
 her hands are dry.  There is no active drainage.





BARB/KARIN

DD:  10/27/2019 20:10:48Voice ID:  [id number]

DT:  10/27/2019 22:10:42Report ID:  [id number]

## 2019-10-27 NOTE — PN
Patient is generally stable.  She is afebrile.  She is eating adequately.  We are waiting for accepta
nce of her transfer.





BARB/KARIN

DD:  10/26/2019 20:29:25Voice ID:  [id number]

DT:  10/27/2019 00:16:58Report ID:  [id number]

## 2019-10-28 VITALS — OXYGEN SATURATION: 94 %

## 2019-10-28 RX ADMIN — DIVALPROEX SODIUM SCH MG: 500 TABLET, DELAYED RELEASE ORAL at 09:49

## 2019-10-28 RX ADMIN — AMITRIPTYLINE HYDROCHLORIDE SCH MG: 50 TABLET, FILM COATED ORAL at 21:08

## 2019-10-28 RX ADMIN — Medication SCH: at 20:45

## 2019-10-28 RX ADMIN — SODIUM CHLORIDE SCH MLS: 0.9 INJECTION, SOLUTION INTRAVENOUS at 16:18

## 2019-10-28 RX ADMIN — CEPHALEXIN SCH MG: 500 CAPSULE ORAL at 17:49

## 2019-10-28 RX ADMIN — Medication SCH: at 13:26

## 2019-10-28 RX ADMIN — CEPHALEXIN SCH MG: 500 CAPSULE ORAL at 05:17

## 2019-10-28 RX ADMIN — RISPERIDONE SCH MG: 1 TABLET, FILM COATED ORAL at 09:49

## 2019-10-28 RX ADMIN — DIVALPROEX SODIUM SCH MG: 500 TABLET, DELAYED RELEASE ORAL at 21:08

## 2019-10-28 RX ADMIN — Medication SCH: at 09:00

## 2019-10-28 RX ADMIN — CEPHALEXIN SCH: 500 CAPSULE ORAL at 00:00

## 2019-10-28 RX ADMIN — CEPHALEXIN SCH MG: 500 CAPSULE ORAL at 12:33

## 2019-10-28 RX ADMIN — CEPHALEXIN SCH MG: 500 CAPSULE ORAL at 23:24

## 2019-10-28 RX ADMIN — BENZTROPINE MESYLATE SCH MG: 1 TABLET ORAL at 09:50

## 2019-10-28 RX ADMIN — MUPIROCIN SCH APPL: 20 OINTMENT TOPICAL at 09:50

## 2019-10-28 RX ADMIN — AMITRIPTYLINE HYDROCHLORIDE SCH MG: 50 TABLET, FILM COATED ORAL at 09:49

## 2019-10-28 RX ADMIN — BENZTROPINE MESYLATE SCH MG: 1 TABLET ORAL at 21:07

## 2019-10-28 RX ADMIN — BISACODYL PRN MG: 5 TABLET, COATED ORAL at 17:49

## 2019-10-28 RX ADMIN — ENOXAPARIN SODIUM SCH MG: 30 INJECTION SUBCUTANEOUS at 09:49

## 2019-10-28 RX ADMIN — RISPERIDONE SCH MG: 1 TABLET, FILM COATED ORAL at 21:08

## 2019-10-28 RX ADMIN — Medication SCH ML: at 16:18

## 2019-10-29 RX ADMIN — CEPHALEXIN SCH MG: 500 CAPSULE ORAL at 14:03

## 2019-10-29 RX ADMIN — CEPHALEXIN SCH MG: 500 CAPSULE ORAL at 18:05

## 2019-10-29 RX ADMIN — ENOXAPARIN SODIUM SCH MG: 30 INJECTION SUBCUTANEOUS at 09:06

## 2019-10-29 RX ADMIN — BENZTROPINE MESYLATE SCH MG: 1 TABLET ORAL at 09:06

## 2019-10-29 RX ADMIN — SODIUM CHLORIDE SCH MLS: 0.9 INJECTION, SOLUTION INTRAVENOUS at 14:03

## 2019-10-29 RX ADMIN — BENZTROPINE MESYLATE SCH MG: 1 TABLET ORAL at 20:19

## 2019-10-29 RX ADMIN — Medication SCH ML: at 14:04

## 2019-10-29 RX ADMIN — AMITRIPTYLINE HYDROCHLORIDE SCH MG: 50 TABLET, FILM COATED ORAL at 20:20

## 2019-10-29 RX ADMIN — AMITRIPTYLINE HYDROCHLORIDE SCH MG: 50 TABLET, FILM COATED ORAL at 09:06

## 2019-10-29 RX ADMIN — MUPIROCIN SCH APPL: 20 OINTMENT TOPICAL at 18:05

## 2019-10-29 RX ADMIN — CEPHALEXIN SCH MG: 500 CAPSULE ORAL at 05:13

## 2019-10-29 RX ADMIN — MUPIROCIN SCH APPL: 20 OINTMENT TOPICAL at 09:00

## 2019-10-29 RX ADMIN — RISPERIDONE SCH MG: 1 TABLET, FILM COATED ORAL at 20:23

## 2019-10-29 RX ADMIN — DIVALPROEX SODIUM SCH MG: 500 TABLET, DELAYED RELEASE ORAL at 20:19

## 2019-10-29 RX ADMIN — Medication SCH: at 09:00

## 2019-10-29 RX ADMIN — RISPERIDONE SCH MG: 1 TABLET, FILM COATED ORAL at 09:06

## 2019-10-29 RX ADMIN — CEPHALEXIN SCH MG: 500 CAPSULE ORAL at 23:39

## 2019-10-29 RX ADMIN — DIVALPROEX SODIUM SCH MG: 500 TABLET, DELAYED RELEASE ORAL at 09:06

## 2019-10-29 RX ADMIN — Medication SCH ML: at 09:08

## 2019-10-30 RX ADMIN — DIVALPROEX SODIUM SCH MG: 500 TABLET, DELAYED RELEASE ORAL at 20:19

## 2019-10-30 RX ADMIN — CEPHALEXIN SCH MG: 500 CAPSULE ORAL at 17:16

## 2019-10-30 RX ADMIN — DIVALPROEX SODIUM SCH MG: 500 TABLET, DELAYED RELEASE ORAL at 07:53

## 2019-10-30 RX ADMIN — RISPERIDONE SCH MG: 1 TABLET, FILM COATED ORAL at 20:41

## 2019-10-30 RX ADMIN — ENOXAPARIN SODIUM SCH MG: 30 INJECTION SUBCUTANEOUS at 07:54

## 2019-10-30 RX ADMIN — SODIUM CHLORIDE SCH MLS: 0.9 INJECTION, SOLUTION INTRAVENOUS at 07:54

## 2019-10-30 RX ADMIN — BENZTROPINE MESYLATE SCH MG: 1 TABLET ORAL at 07:53

## 2019-10-30 RX ADMIN — CEPHALEXIN SCH MG: 500 CAPSULE ORAL at 11:53

## 2019-10-30 RX ADMIN — BISACODYL PRN MG: 5 TABLET, COATED ORAL at 07:53

## 2019-10-30 RX ADMIN — CEPHALEXIN SCH MG: 500 CAPSULE ORAL at 23:40

## 2019-10-30 RX ADMIN — Medication SCH: at 14:00

## 2019-10-30 RX ADMIN — AMITRIPTYLINE HYDROCHLORIDE SCH MG: 50 TABLET, FILM COATED ORAL at 20:19

## 2019-10-30 RX ADMIN — MUPIROCIN SCH APPL: 20 OINTMENT TOPICAL at 07:54

## 2019-10-30 RX ADMIN — RISPERIDONE SCH MG: 1 TABLET, FILM COATED ORAL at 07:53

## 2019-10-30 RX ADMIN — BENZTROPINE MESYLATE SCH MG: 1 TABLET ORAL at 20:18

## 2019-10-30 RX ADMIN — AMITRIPTYLINE HYDROCHLORIDE SCH MG: 50 TABLET, FILM COATED ORAL at 07:54

## 2019-10-30 RX ADMIN — CEPHALEXIN SCH MG: 500 CAPSULE ORAL at 05:17

## 2019-10-30 RX ADMIN — Medication SCH ML: at 07:55

## 2019-10-30 NOTE — PN
The patient still is confused.  The patient is being followed by psychiatric consultation.  The recom
mendations are to send her to nursing home and to have outpatient followup.  I spoke to the nurse reg
arding her transfer to nursing home.  As soon as this is arranged, she can leave the hospital.





BARB/KARIN

DD:  10/29/2019 21:29:08Voice ID:  [id number]

DT:  10/30/2019 02:07:14Report ID:  [id number]

## 2019-10-30 NOTE — CON
SUBJECTIVE:

Ms. Marcia Peña is a 59-year-old  female,  with no children
, unemployed and lives alone until recent admission via the ER on October 15,
2019.. She was admitted on account of new onset seizure,with associated altered 
mental status.  Patient does have psychiatric history significant for bipolar 
disorder and schizophrenia.  Psychiatry is consulted by primary team for 
psychiatric evaluation, adjustment of patient's psychiatric medications and 
plans for discharge to a skill facility.  

On interview, patient was uncooperative, states she is irritable and frustrated 
being in the hospital, states she was brought to the hospital without her 
consent.  She denies feeling depressed, denies feeling hopeless and helpless, 
denies suicide ideation and self-injurious thoughts/behavior. Patient denies 
auditory and visual hallucination. Per caregiver, patient has been very 
combative few days following admission but she is much calmer now. Collateral 
information obtained from patient's POA Mr. Guillen who is also patient's . 
Mr Guillen states patient has history severe psychiatry history that has resulted 
in multiple acute psychiatric inpatient hospitalization. He states patient is 
not usually compliant with her medications which always result in her been 
admitted each time she off her medications, he states on such instances patient 
because very disorganized and psychotic. He states patient has had 2 
psychiatric inpatient admissions this year, once in May and then April. Mr Guillen 
states patient is usually stable when she is on medication, he states patient 
Father while alive so to manage her medications and when he passed patient's 
brother took over managing her medication. however since the death of patient's 
brother there is no one available to assist with making patient is compliant 
with her Meds. Mr Guillen states that was how he became her POA. He states with 
the death of her family members she became saddled with the responsibility of 
managing her own medications, which obviously she has not being doing very 
well. He states that in his own opinion she is unable to care for herself and 
will need to be in place where she could be cared for. Findings on admission 
includes; Mild hyponatremia of 132, hypokalemia and no significant findings on 
Brain CT-scan, EEG showed normal brain wave pattern    



Physical Examination:

Vital Signs:  Blood pressure 150/83, pulse rate 71, respiratory rate 18, O2 
saturation on room air is 92%, temperature 97.9.  

Mental Status Examination:  Patient is a fairly well nourished  female
, looking older than stated age, not in any obvious acute distress.  She is 
alert, oriented to person and place only. She looks unkempt.  Speech is 
dysarthric and difficult to comprehend at times.  No abnormal movements 
observed. Mild-to-moderate psychomotor agitation noted.  She described her mood 
as irritable and frustrated. Affect is mood congruent Thought process is mostly 
circumstantial. Thought Content: No suicidal ideation, no delusions, no 
ruminations or obsessions. Perceptual disturbance: No auditory or visual 
hallucinations observed   Fund of knowledge average Language skills is fair.



Assessment:  A 59-year-old  female with significant psychiatric history
, has diagnoses of bipolar disorder and schizophrenia  with multiple 
psychiatric inpatient hospitalization, poor social support and poor insight 
into her mental illness, admitted for new onset seizure. No suicidal ideation, 
no acute psychosis.



Diagnoses: 

Bipolar disorder, most current episode mixed   

Schizophrenia chronic

Schizoaffective disorder unspecified



Plan:  

1.   Will continue amitriptyline 100 mg p.o. b.i.d. for depressive symptoms.

2.   Increase Risperidone to 2 mg p.o. b.i.d. for agitation and psychotic 
symptoms.

3.   Continue Depakote 500 mg p.o. b.i.d. for mood stability.

4.   Discontinue Paxil 20 mg p.o. daily.  Due its anticholinergic properties of 
Paxil which might contribute to patient confusion.

5.   Discontinue Loxapine 25 mg p.o. b.i.d. May lead to seizures.

6.   Follow up with outpatient psychiatry in 2 weeks.

7.   Recommend transfer of patient to a skilled facility on discharge by 
primary team 

Thank you for consulting Psychiatry.





BLAINE

DD:  10/29/2019 09:19:56   Voice ID:  [id number]

DT:  10/29/2019 14:25:36   Report ID:  [id number]

FISH

## 2019-10-31 VITALS — SYSTOLIC BLOOD PRESSURE: 156 MMHG | DIASTOLIC BLOOD PRESSURE: 86 MMHG | TEMPERATURE: 97.3 F

## 2019-10-31 RX ADMIN — MUPIROCIN SCH APPL: 20 OINTMENT TOPICAL at 08:29

## 2019-10-31 RX ADMIN — CEPHALEXIN SCH MG: 500 CAPSULE ORAL at 11:29

## 2019-10-31 RX ADMIN — RISPERIDONE SCH MG: 1 TABLET, FILM COATED ORAL at 08:27

## 2019-10-31 RX ADMIN — Medication SCH ML: at 08:29

## 2019-10-31 RX ADMIN — Medication SCH: at 13:37

## 2019-10-31 RX ADMIN — DIVALPROEX SODIUM SCH MG: 500 TABLET, DELAYED RELEASE ORAL at 08:27

## 2019-10-31 RX ADMIN — BENZTROPINE MESYLATE SCH MG: 1 TABLET ORAL at 08:28

## 2019-10-31 RX ADMIN — CEPHALEXIN SCH MG: 500 CAPSULE ORAL at 05:10

## 2019-10-31 RX ADMIN — AMITRIPTYLINE HYDROCHLORIDE SCH MG: 50 TABLET, FILM COATED ORAL at 08:27

## 2019-10-31 RX ADMIN — SODIUM CHLORIDE SCH MLS: 0.9 INJECTION, SOLUTION INTRAVENOUS at 10:43

## 2019-10-31 RX ADMIN — ENOXAPARIN SODIUM SCH MG: 30 INJECTION SUBCUTANEOUS at 08:29

## 2019-10-31 RX ADMIN — SODIUM CHLORIDE SCH: 0.9 INJECTION, SOLUTION INTRAVENOUS at 05:00

## 2019-10-31 NOTE — PN
Patient is stable medically.  She is still confused.  Nursing home administration and nurses have vis
ited the patient to see whether she could be transferred to nursing home with visits to the psychiatr
ist for monitoring her medication and changing medications as needed.





BARB/KARIN

DD:  10/30/2019 18:45:27Voice ID:  [id number]

DT:  10/30/2019 23:53:03Report ID:  [id number]

## 2019-11-07 NOTE — DS
Date of Discharge:  10/31/2019



Final Diagnoses:  

1.Seizure disorder, new onset.

2.Severe schizophrenia.

3.Hypokalemia.

4.Infected abrasions of hands.

5.Difficult to survive at home.



Hospital Course:  This patient was admitted initially following a seizure through the emergency room.
  After admission to the hospital, the patient received Keppra.  The patient also was restarted on he
r psych medications.  The patient was seen by Dr. Otero, who felt that the patient did not have an
y stroke or other acute neurological event.  The patient started complaining of multiple areas of raji
n because of a history of seizures, multiple x-rays were done to see if there is any fracture, howeve
r there was no evidence of fracture.  Meanwhile, she started having fever.  Extensive testing includi
ng CT of the abdomen and pelvis was done.  The only finding was infected wounds of the hands.  The pa
tient received antibiotic for that.  Patient subsequently needed a place to stay, nursing home was co
nsidered, however they wanted clearance from psychiatrist, this was done and psychiatric recommendati
on was that she could survive in the nursing home and she did not go to a psych facility.  At this po
int, the patient was discharged to nursing home with advice to see the psychiatrist as an outpatient.




Laboratory Data:  Laboratory is extensive, please refer to the chart.





BARB/KARIN

DD:  11/06/2019 18:20:13Voice ID:  [id number]

DT:  11/07/2019 03:29:29Report ID:  [id number]

## 2019-12-26 ENCOUNTER — HOSPITAL ENCOUNTER (EMERGENCY)
Dept: HOSPITAL 97 - ER | Age: 59
Discharge: HOME | End: 2019-12-26
Payer: COMMERCIAL

## 2019-12-26 VITALS — SYSTOLIC BLOOD PRESSURE: 136 MMHG | OXYGEN SATURATION: 99 % | DIASTOLIC BLOOD PRESSURE: 87 MMHG

## 2019-12-26 VITALS — TEMPERATURE: 98.2 F

## 2019-12-26 DIAGNOSIS — F20.9: ICD-10-CM

## 2019-12-26 DIAGNOSIS — F31.9: ICD-10-CM

## 2019-12-26 DIAGNOSIS — G40.802: Primary | ICD-10-CM

## 2019-12-26 DIAGNOSIS — Z88.5: ICD-10-CM

## 2019-12-26 DIAGNOSIS — N39.0: ICD-10-CM

## 2019-12-26 DIAGNOSIS — Z88.3: ICD-10-CM

## 2019-12-26 LAB
ALBUMIN SERPL BCP-MCNC: 3.6 G/DL (ref 3.4–5)
ALP SERPL-CCNC: 267 U/L (ref 45–117)
ALT SERPL W P-5'-P-CCNC: 34 U/L (ref 12–78)
AST SERPL W P-5'-P-CCNC: 21 U/L (ref 15–37)
BUN BLD-MCNC: 4 MG/DL (ref 7–18)
GLUCOSE SERPLBLD-MCNC: 176 MG/DL (ref 74–106)
HCT VFR BLD CALC: 40.8 % (ref 36–45)
INR BLD: 1.16
LIPASE SERPL-CCNC: 63 U/L (ref 73–393)
LYMPHOCYTES # SPEC AUTO: 1.8 K/UL (ref 0.7–4.9)
MAGNESIUM SERPL-MCNC: 1.9 MG/DL (ref 1.8–2.4)
METHAMPHET UR QL SCN: NEGATIVE
PMV BLD: 9.6 FL (ref 7.6–11.3)
POTASSIUM SERPL-SCNC: 3.1 MMOL/L (ref 3.5–5.1)
RBC # BLD: 4.6 M/UL (ref 3.86–4.86)
THC SERPL-MCNC: NEGATIVE NG/ML
TROPONIN (EMERG DEPT USE ONLY): < 0.02 NG/ML (ref 0–0.04)
VALPROATE SERPL-MCNC: < 3 UG/ML (ref 50–100)

## 2019-12-26 PROCEDURE — 80076 HEPATIC FUNCTION PANEL: CPT

## 2019-12-26 PROCEDURE — 82140 ASSAY OF AMMONIA: CPT

## 2019-12-26 PROCEDURE — 85730 THROMBOPLASTIN TIME PARTIAL: CPT

## 2019-12-26 PROCEDURE — 82550 ASSAY OF CK (CPK): CPT

## 2019-12-26 PROCEDURE — 82947 ASSAY GLUCOSE BLOOD QUANT: CPT

## 2019-12-26 PROCEDURE — 83690 ASSAY OF LIPASE: CPT

## 2019-12-26 PROCEDURE — 36415 COLL VENOUS BLD VENIPUNCTURE: CPT

## 2019-12-26 PROCEDURE — 81003 URINALYSIS AUTO W/O SCOPE: CPT

## 2019-12-26 PROCEDURE — 85025 COMPLETE CBC W/AUTO DIFF WBC: CPT

## 2019-12-26 PROCEDURE — 99285 EMERGENCY DEPT VISIT HI MDM: CPT

## 2019-12-26 PROCEDURE — 84484 ASSAY OF TROPONIN QUANT: CPT

## 2019-12-26 PROCEDURE — 83735 ASSAY OF MAGNESIUM: CPT

## 2019-12-26 PROCEDURE — 80307 DRUG TEST PRSMV CHEM ANLYZR: CPT

## 2019-12-26 PROCEDURE — 72125 CT NECK SPINE W/O DYE: CPT

## 2019-12-26 PROCEDURE — 80164 ASSAY DIPROPYLACETIC ACD TOT: CPT

## 2019-12-26 PROCEDURE — 85610 PROTHROMBIN TIME: CPT

## 2019-12-26 PROCEDURE — 70450 CT HEAD/BRAIN W/O DYE: CPT

## 2019-12-26 PROCEDURE — 93005 ELECTROCARDIOGRAM TRACING: CPT

## 2019-12-26 PROCEDURE — 80048 BASIC METABOLIC PNL TOTAL CA: CPT

## 2019-12-26 NOTE — RAD REPORT
EXAM DESCRIPTION:  CT - [id number] - 12/26/2019 3:18 pm

 

CLINICAL HISTORY:  Transient alteration of awareness, fall, head and neck injury

 

COMPARISON:  CT head October 15, cervical spine CT 2011

 

TECHNIQUE:  Axial 5 mm thick images of the head were obtained.  Axial 2 mm thick images of the cervic
al spine were obtained with sagittal and coronal reconstruction images generated and reviewed.

 

All CT scans are performed using dose optimization technique as appropriate and may include automated
 exposure control or mA/KV adjustment according to patient size.

 

FINDINGS:

No intracranial hemorrhage, mass, edema or acute intracranial finding. No suspicion for acute infarct
ion. No extra-axial fluid collections. Mastoid air cells and paranasal sinuses are clear. No globe or
 orbit abnormality seen. Atrophy and chronic ischemic change match comparison. Ventricles are in prop
ortion to any volume loss. Lucent foci in the skull are not suspected to be pathologic.

 

Cervical body height and alignment are normal. No disk space narrowing. No fracture or acute bony abn
ormality. Incidental note made of hemangioma in the C6 body. Central canal detail is inherently limit
ed.

 

No paraspinal mass or hematoma.

 

IMPRESSION:  Negative CT head examination for acute or significant finding.

 

Negative CT cervical spine examination for acute or significant finding.

## 2019-12-26 NOTE — EDPHYS
Physician Documentation                                                                           

 [facility]                                                                 

Name: Marcia Peña                                                                              

Age: 59 yrs                                                                                       

Sex: Female                                                                                       

: 1960                                                                                   

MRN: [id number]                                                                                   

Arrival Date: 2019                                                                          

Time: 14:39                                                                                       

Account#: [id number]                                                                            

Bed 4                                                                                             

Private MD:                                                                                       

ED Physician Murray Felix                                                                         

HPI:                                                                                              

                                                                                             

14:51 This 59 yrs old  Female presents to ER via [facility] with complaints of       rn  

      syncope vs seizure.                                                                         

14:51 The patient presents with decreased responsiveness. Onset: The symptoms/episode         rn  

      began/occurred just prior to arrival. Possible causes: seizure. Current symptoms: In        

      the emergency department the patient's symptoms have improved. Per EMS, at [employer]           

      store, in checkout line, rosas dup and fell to ground, unknown if seizure activity, was     

      slightly combative and no verbal responses, + localizes pain. Has known seizure             

      disorder, was alone so no more information given. .                                         

                                                                                                  

Historical:                                                                                       

- Allergies:                                                                                      

14:39 Codeine;                                                                                aa5 

14:39 Erythromycin;                                                                           aa5 

- Home Meds:                                                                                      

14:39 amitriptyline 100 mg Oral tab 1 tab 2 times per day [Active]; benztropine 0.5 mg Oral   aa5 

      tab 1 tab 2 times per day [Active]; Depakote  mg Oral Tb24 twice a day [Active];      

      loxapine succinate 25 mg Oral cap 3 times per day [Active]; paroxetine HCl 20 mg Oral       

      tab 1 tab once daily [Active]; risperidone 3 mg Oral tab 1 tab 2 times per day [Active];    

- PMHx:                                                                                           

14:39 Bipolar disorder; Schizophrenia;                                                        aa5 

14:40 Seizures;                                                                               aa5 

- PSHx:                                                                                           

14:39 Unable to obtain;                                                                       aa5 

                                                                                                  

- Immunization history:: Adult Immunizations unknown.                                             

- Social history:: Smoking status: unknown.                                                       

- Ebola Screening: : No symptoms or risks identified at this time.                                

- History obtained from: EMS.                                                                     

                                                                                                  

                                                                                                  

ROS:                                                                                              

14:51 Unable to obtain ROS due to altered mental status.                                      rn  

                                                                                                  

Exam:                                                                                             

14:51 Constitutional:  This is a well developed, well nourished patient who is awake, alert,  rn  

      and in no acute distress. Head/Face:  Normocephalic, atraumatic. Eyes:  Pupils equal        

      round and reactive to light, extra-ocular motions intact.  Periorbital areas with no        

      swelling, redness, or edema. ENT:  dry MM, no oral laceration or injury Neck:  Trachea      

      midline, no thyromegaly or masses palpated, and no cervical lymphadenopathy.  Supple,       

      full range of motion without nuchal rigidity, or vertebral point tenderness.  No            

      Meningismus. Cardiovascular:  Regular rate and rhythm.  No pulse deficits. Respiratory:     

       No increased work of breathing, no retractions or nasal flaring. Abdomen/GI:  soft,        

      non-tender, non-distended MS/ Extremity:  Pulses equal, no cyanosis.  Neurovascular         

      intact.  Moving all 4 extremities and localizes pain Neuro:  Awake and alert, moves all     

      4 ext, localizes pain, now speaking slowly, knows name, seems post-ictal                    

                                                                                                  

Vital Signs:                                                                                      

14:39  / 89; Pulse 92; Resp 18 S; Temp 98.2(TE); Pulse Ox 97% on R/A;                   aa5 

16:32  / 87; Pulse 93; Resp 18; Pulse Ox 99% on R/A;                                    em1 

17:15  / 82; Pulse 88; Resp 18 S; Temp 98.0(TE); Pulse Ox 99% on R/A;                   aa5 

                                                                                                  

MDM:                                                                                              

14:41 Patient medically screened.                                                             rn  

16:16 Differential Diagnosis: electrolyte abnormality, intracranial bleed, UTI, volume        rn  

      depletion. Data reviewed: vital signs, nurses notes, lab test result(s), EKG,               

      radiologic studies, CT scan, and as a result, I will discharge patient. Counseling: I       

      had a detailed discussion with the patient and/or guardian regarding: the historical        

      points, exam findings, and any diagnostic results supporting the discharge/admit            

      diagnosis, lab results, radiology results, the need for outpatient follow up, to return     

      to the emergency department if symptoms worsen or persist or if there are any questions     

      or concerns that arise at home. Response to treatment: the patient's symptoms have          

      markedly improved after treatment, and as a result, I will discharge patient. Special       

      discussion: I discussed with the patient/guardian in detail that at this point there is     

      no indication for admission to the hospital. It is understood, however, that if the         

      symptoms persist or worsen the patient needs to return immediately for re-evaluation.       

      Based on the history and exam findings, there is no indication for further emergent         

      testing or inpatient evaluation. I discussed with the patient/guardian the need to see      

      the neurologist for further evaluation of the symptoms. I discussed with the                

      patient/guardian the need to see the primary care provider for further evaluation of        

      the symptoms. ED course: Much more alert, answering all questions knows about Suzanne's       

      impeachment, date, place and time. + UTI, not compliant with depakote, will dc home         

      with abx and instructions to take her seizure meds. .                                       

17:30 ED course: Power of  here, confirms that patient known to not take medication. .rn  

                                                                                                  

                                                                                             

14:41 Order name: UDS                                                                         rn  

                                                                                             

14:41 Order name: Basic Metabolic Panel; Complete Time: 15:57                                 rn  

                                                                                             

14:41 Order name: CBC with Diff; Complete Time: 15:57                                         rn  

                                                                                             

14:41 Order name: CPK; Complete Time: 15:57                                                   rn  

                                                                                             

14:41 Order name: Hepatic Function; Complete Time: 15:57                                      rn  

                                                                                             

14:41 Order name: Lipase; Complete Time: 15:57                                                rn  

                                                                                             

14:41 Order name: Magnesium; Complete Time: 15:57                                             rn  

                                                                                             

14:41 Order name: Protime (+inr); Complete Time: 15:57                                        rn  

                                                                                             

14:41 Order name: Ptt, Activated; Complete Time: 15:57                                        rn  

                                                                                             

14:41 Order name: Troponin (emerg Dept Use Only); Complete Time: 15:57                        rn  

                                                                                             

14:41 Order name: AMMONIA; Complete Time: 15:57                                               rn  

                                                                                             

14:41 Order name: Depakote; Complete Time: 15:57                                              rn  

                                                                                             

15:10 Order name: Glucose, Ancillary Testing; Complete Time: 15:57                            EDMS

                                                                                             

16:24 Order name: Urine Dipstick--Ancillary (enter results)                                     

                                                                                             

14:41 Order name: CT Head C Spine; Complete Time: 15:57                                       rn  

                                                                                             

14:41 Order name: EKG; Complete Time: 14:42                                                   rn  

                                                                                             

14:41 Order name: Cardiac monitoring; Complete Time: 15:12                                    rn  

                                                                                             

14:41 Order name: EKG - Nurse/Tech; Complete Time: 15:12                                      rn  

                                                                                             

14:41 Order name: IV Saline Lock; Complete Time: 15:12                                        rn  

                                                                                             

14:41 Order name: Labs collected and sent; Complete Time: 15:12                               rn  

                                                                                             

14:41 Order name: NPO; Complete Time: 15:12                                                   rn  

                                                                                             

14:41 Order name: O2 Per Protocol; Complete Time: 15:12                                       rn  

                                                                                             

14:41 Order name: O2 Sat Monitoring; Complete Time: 15:12                                     rn  

                                                                                             

14:41 Order name: Urine Dipstick-Ancillary (obtain specimen); Complete Time: 16:33            rn  

                                                                                                  

Administered Medications:                                                                         

16:15 Drug: Depakote 500 mg Route: PO;                                                        aa5 

16:51 Follow up: Response: No adverse reaction                                                aa5 

                                                                                                  

                                                                                                  

Point of Care Testing:                                                                            

      Blood Glucose:                                                                              

14:57 Blood Glucose: 181 mg/dL;                                                               aa5 

      Ranges:                                                                                     

      Critical Glucose Levels:Adult <50 mg/dl or >400 mg/dl  <40 mg/dl or >180 mg/dl       

Disposition:                                                                                      

19 16:18 Discharged to Home. Impression: Epilepsy and recurrent seizures, Urinary tract     

  infection, site not specified.                                                                  

- Condition is Stable.                                                                            

- Discharge Instructions: Seizure, Adult, Urinary Tract Infection, Adult.                         

- Prescriptions for Cipro 500 mg Oral Tablet - take 1 tablet by ORAL route every 12               

  hours for 7 days; 14 tablet.                                                                    

- Medication Reconciliation Form, Thank You Letter, Antibiotic Education, Prescription            

  Opioid Use form.                                                                                

- Follow up: Private Physician; When: As needed; Reason: Recheck today's complaints,              

  Re-evaluation by your physician.                                                                

- Problem is new.                                                                                 

- Symptoms have improved.                                                                         

                                                                                                  

                                                                                                  

                                                                                                  

Signatures:                                                                                       

Dispatcher [employer]                           EDMS                                                 

Murray Felix MD MD rn Calderon, Audri, RN                     RN   aa5                                                  

                                                                                                  

Corrections: (The following items were deleted from the chart)                                    

17:36 16:18 2019 16:18 Discharged to Home. Impression: Epilepsy and recurrent seizures; aa5 

      Urinary tract infection, site not specified. Condition is Stable. Forms are Medication      

      Reconciliation Form, Thank You Letter, Antibiotic Education, Prescription Opioid Use.       

      Follow up: Private Physician; When: As needed; Reason: Recheck today's complaints,          

      Re-evaluation by your physician. Problem is new. Symptoms have improved. rn                 

                                                                                                  

**************************************************************************************************

## 2019-12-26 NOTE — ER
Nurse's Notes                                                                                     

 [facility]                                                                 

Name: Marcia Peña                                                                              

Age: 59 yrs                                                                                       

Sex: Female                                                                                       

: 1960                                                                                   

MRN: [id number]                                                                                   

Arrival Date: 2019                                                                          

Time: 14:39                                                                                       

Account#: [id number]                                                                            

Bed 4                                                                                             

Private MD:                                                                                       

Diagnosis: Epilepsy and recurrent seizures;Urinary tract infection, site not specified            

                                                                                                  

Presentation:                                                                                     

                                                                                             

14:39 Risk Assessment: Do you want to hurt yourself or someone else? Unable to obtain.        aa5 

      Initial Sepsis Screen: Does the patient meet any 2 criteria? No. Patient's initial          

      sepsis screen is negative. Does the patient have a suspected source of infection? No.       

      Patient's initial sepsis screen is negative. Care prior to arrival: IV initiated. 18        

      GA, in the right wrist, Glucose check: 145.                                                 

14:39 Acuity: JIMMY 2                                                                           aa5 

14:39 Presenting complaint: EMS states: pt was in line at [employer] and staff reported    aa5 

      "she locked up and fell onto the floor". EMS reports pt was altered and combative upon      

      scene arrival. Pt currently only stating "no, no, no" as ER staff is attempting             

      placement of monitors. Transition of care: patient was not received from another            

      setting of care. Onset of symptoms was 2019.                                   

14:39 Method Of Arrival: EMS: [facility]                                                aa5 

                                                                                                  

Historical:                                                                                       

- Allergies:                                                                                      

14:39 Codeine;                                                                                aa5 

14:39 Erythromycin;                                                                           aa5 

- Home Meds:                                                                                      

14:39 amitriptyline 100 mg Oral tab 1 tab 2 times per day [Active]; benztropine 0.5 mg Oral   aa5 

      tab 1 tab 2 times per day [Active]; Depakote  mg Oral Tb24 twice a day [Active];      

      loxapine succinate 25 mg Oral cap 3 times per day [Active]; paroxetine HCl 20 mg Oral       

      tab 1 tab once daily [Active]; risperidone 3 mg Oral tab 1 tab 2 times per day [Active];    

- PMHx:                                                                                           

14:39 Bipolar disorder; Schizophrenia;                                                        aa5 

14:40 Seizures;                                                                               aa5 

- PSHx:                                                                                           

14:39 Unable to obtain;                                                                       aa5 

                                                                                                  

- Immunization history:: Adult Immunizations unknown.                                             

- Social history:: Smoking status: unknown.                                                       

- Ebola Screening: : No symptoms or risks identified at this time.                                

- History obtained from: EMS.                                                                     

                                                                                                  

                                                                                                  

Screenin:40 Abuse screen: unable to obtain. Nutritional screening: unable to obtain. Tuberculosis   aa5 

      screening: unable to obtain. Fall Risk Fall in past 12 months (25 points). IV access        

      (20 points). Mental Status- Overestimates/Forgets Limitations (15 pts.). Total May        

      Fall Scale indicates High Risk Score (45 or more points). Fall prevention measures have     

      been instituted. Side Rails Up X 2 Placed Close to Nursing Station.                         

                                                                                                  

Assessment:                                                                                       

14:39 General: Appears uncomfortable, Behavior is agitated, uncooperative. Pain: Unable to    aa5 

      use pain scale. Does not appear to understand pain scale. Neuro: Level of Consciousness     

      is awake, confused, Pt currently only stating "no, no, no". Oriented to none Pt unable      

      to follow commands. Pupils are PERRL. Pt noted to be moving all extremities. .              

      Cardiovascular: Heart tones S1 S2 present Rhythm is regular. Respiratory: Airway is         

      patent Respiratory effort is even, unlabored, Respiratory pattern is regular,               

      symmetrical, Breath sounds are clear bilaterally. GI: Abdomen is round non-distended,       

      Bowel sounds present X 4 quads. Abd is soft X 4 quads. : No signs and/or symptoms         

      were reported regarding the genitourinary system. EENT: No signs and/or symptoms were       

      reported regarding the EENT system. Derm: Skin is pink, warm \T\ dry. Abrasions noted to    

      back, no bleeding noted. Musculoskeletal: Range of motion: intact in all extremities.       

14:50 Reassessment: Pt now alert and oriented x person.  are equal. . General: Behavior  aa5 

      is cooperative at times. . Respiratory: Airway is patent Respiratory effort is even,        

      unlabored, Respiratory pattern is regular, symmetrical. Derm: Skin is pink, warm \T\ dry.   

15:00 Reassessment: Pt now resting in bed with eyes closed, respirations even and unlabored,  aa5 

      skin is pink/warm/dry. .                                                                    

15:10 Reassessment: Pt currently sitting at foot of bed attempting to get out of the bed. Pt  aa5 

      states "I just want to go home". Pt currently alert and oriented x 3 but appears            

      confused, pt states "I just fell at home on a black rug and that was it". Pt denies         

      pain at this time. MD at bedside speaking to patient at this time. .                        

15:10 Neuro: Level of Consciousness is awake, obeys commands, confused, Oriented to person,   aa5 

      place, time,  are equal bilaterally Moves all extremities. Speech is normal,           

      Facial symmetry appears normal, Pupils are PERRLA.                                          

15:10 Reassessment: Pt's affect is flat .                                                     aa5 

15:15 Reassessment: Pt taken to CT .                                                          aa5 

15:26 Reassessment: Reassessment: Pt back from CT, attempting to leave ER. Pt refuses to get  aa5 

      back into bed, requesting to stay in wheelchair. ER tech at bedside as sitter. Pt           

      remains in wheelchair. Cooperative and calm at this time. .                                 

15:26 Neuro: Level of Consciousness is awake, obeys commands, confused, Oriented to person,   aa5 

      place, time. Respiratory: Airway is patent Respiratory effort is even, unlabored,           

      Respiratory pattern is regular, symmetrical. Derm: Skin is pink, warm \T\ dry.              

16:25 Reassessment: Contacted Wilmer Adams listed as person to verify and left voicemail to   aa5 

      find patient a ride home for d/c. .                                                         

16:30 Neuro: Level of Consciousness is awake, alert, obeys commands, Oriented to person,      aa5 

      place, time. Respiratory: Airway is patent Respiratory effort is even, unlabored,           

      Respiratory pattern is regular, symmetrical. Derm: Skin is pink, warm \T\ dry.              

16:30 Reassessment: Pt remains in wheelchair, sitter remains at bedside. .                    aa5 

16:48 Reassessment: Contacted Argenis Adams (listed as next of kin), left voicemail. .       aa5 

17:26 Reassessment: Wilmer at bedside speaking to Dr. Felix .                                   aa5 

17:26 Neuro: Level of Consciousness is awake, alert, obeys commands, Oriented to person,      aa5 

      place, time, Affect is flat .                                                               

17:26 Respiratory: Airway is patent Respiratory effort is even, unlabored, Respiratory        aa5 

      pattern is regular, symmetrical. Derm: Skin is pink, warm \T\ dry.                          

                                                                                                  

Psych:                                                                                            

16:30 Objective: Affect is flat.                                                              aa5 

                                                                                                  

Vital Signs:                                                                                      

14:39  / 89; Pulse 92; Resp 18 S; Temp 98.2(TE); Pulse Ox 97% on R/A;                   aa5 

16:32  / 87; Pulse 93; Resp 18; Pulse Ox 99% on R/A;                                    em1 

17:15  / 82; Pulse 88; Resp 18 S; Temp 98.0(TE); Pulse Ox 99% on R/A;                   aa5 

                                                                                                  

ED Course:                                                                                        

14:39 Patient arrived in ED.                                                                  rn  

14:39 Arm band placed on Patient placed in an exam room, on a stretcher.                      aa5 

14:39 Patient has correct armband on for positive identification. Placed in gown. Bed in low  aa5 

      position. Side rails up X2. Cardiac monitor on. Pulse ox on. NIBP on.                       

14:41 Murray Felix MD is Attending Physician.                                                rn  

14:50 Initial lab(s) drawn, by me, sent to lab.                                               aa5 

14:54 EKG done, by EKG tech. reviewed by Murray Felix MD.                                      at1 

15:03 Triage completed.                                                                       aa5 

15:15 Note: pt refused to lay back in bed, so transported by wheelchair to CT.                nj  

15:17 CT completed. Patient tolerated procedure well. Patient moved back from CT.             nj  

15:18 Federica Broussard, RN is Primary Nurse.                                                   aa5 

15:19 CT Head C Spine In Process Unspecified.                                                 EDMS

15:23 No provider procedures requiring assistance completed.                                  aa5 

17:25 IV discontinued, intact, bleeding controlled, No redness/swelling at site. Pressure     aa5 

      dressing applied.                                                                           

                                                                                                  

Administered Medications:                                                                         

16:15 Drug: Depakote 500 mg Route: PO;                                                        aa5 

16:51 Follow up: Response: No adverse reaction                                                aa5 

                                                                                                  

                                                                                                  

Point of Care Testing:                                                                            

      Blood Glucose:                                                                              

14:57 Blood Glucose: 181 mg/dL;                                                               aa5 

      Ranges:                                                                                     

                                                                                                  

Outcome:                                                                                          

16:18 Discharge ordered by MD.                                                                rn  

17:28 Discharged to home via wheelchair, with friend                                          aa5 

17:28 Condition: stable                                                                           

17:28 Discharge instructions given to patient, friend (POA) Instructed on discharge               

      instructions, follow up and referral plans. medication usage, Demonstrated                  

      understanding of instructions, follow-up care, medications, Prescriptions given X 1.        

17:36 Patient left the ED.                                                                    aa5 

                                                                                                  

Signatures:                                                                                       

Dispatcher [employer]                           EDMS                                                 

Murray Felix MD MD rn Martinez, Eric                               em1                                                  

Federica Broussard RN                     RN   aa5                                                  

Lou Ward, EKG Tech              EKG Tat1                                                  

Ag Cooley                                                   

                                                                                                  

Corrections: (The following items were deleted from the chart)                                    

15:28 14:40 Reassessment: Pt now alert and oriented x person.  are equal. . aa5          aa5 

15: 14:40 General: Behavior is cooperative at times. . aa5                                  aa5 

15: 14:40 Derm: Skin is pink, warm \T\ dry. aa5                                               aa5

15: 14:40 Respiratory: Airway is patent Respiratory effort is even, unlabored, Respiratory  aa5 

      pattern is regular, symmetrical, aa5                                                        

16:26 14:39 Derm: Skin is pink, warm \T\ dry. aa5                                               aa5

16: 15:26 Reassessment: aa5                                                                 aa5 

17:38 16:25 IV discontinued, intact, bleeding controlled, No redness/swelling at site.        aa5 

      Pressure dressing applied, aa5                                                              

17: 16:30 Neuro: Level of Consciousness is awake, obeys commands, confused, Oriented to     aa5 

      person, place, time, aa5                                                                    

                                                                                                  

**************************************************************************************************

## 2019-12-27 NOTE — EKG
Test Date:    2019-12-26               Test Time:    14:43:58

Technician:   HARJIT                                     

                                                     

MEASUREMENT RESULTS:                                       

Intervals:                                           

Rate:         104                                    

NH:           152                                    

QRSD:         92                                     

QT:           338                                    

QTc:          444                                    

Axis:                                                

P:            64                                     

NH:           152                                    

QRS:          72                                     

T:            44                                     

                                                     

INTERPRETIVE STATEMENTS:                                       

                                                     

Sinus tachycardia

Right atrial enlargement

ST abnormality, possible digitalis effect

Abnormal ECG

Compared to ECG 10/15/2019 10:46:44

Atrial abnormality now present

ST (T wave) deviation now present

Sinus rhythm no longer present

Myocardial infarct finding no longer present



Electronically Signed On 12-27-19 13:46:57 CST by Boyd Urrutia

## 2024-01-12 NOTE — PN
Patient started having a fever last night.  She has abrasions of both hands.  Her WBCs are elevated t
o 13,000.  Her urine shows nitrite positivity and pus cells, however, bacteria count is low.  It is n
ot clear due to inadequate history from the patient as to the source of her fever.  Patient is starte
d on Rocephin.  She will receive IV fluids and she will have CT scan of the abdomen and pelvis to see
 if there is some source of infection.  Patient, however, is not complaining of abdominal pain.  Terri
ent will have repeat CBC in the a.m.





BARB/KARIN

DD:  10/20/2019 15:54:24Voice ID:  [id number]

DT:  10/20/2019 19:42:28Report ID:  [id number] Principal Discharge DX:	Tension headache   1